# Patient Record
Sex: MALE | Race: WHITE | NOT HISPANIC OR LATINO | Employment: OTHER | RURAL
[De-identification: names, ages, dates, MRNs, and addresses within clinical notes are randomized per-mention and may not be internally consistent; named-entity substitution may affect disease eponyms.]

---

## 2023-10-03 ENCOUNTER — HOSPITAL ENCOUNTER (EMERGENCY)
Facility: HOSPITAL | Age: 68
Discharge: HOME | End: 2023-10-03
Payer: MEDICARE

## 2023-10-03 VITALS
SYSTOLIC BLOOD PRESSURE: 147 MMHG | HEIGHT: 67 IN | WEIGHT: 154 LBS | RESPIRATION RATE: 16 BRPM | HEART RATE: 74 BPM | TEMPERATURE: 98 F | BODY MASS INDEX: 24.17 KG/M2 | OXYGEN SATURATION: 97 % | DIASTOLIC BLOOD PRESSURE: 71 MMHG

## 2023-10-03 DIAGNOSIS — L03.90 ACUTE CELLULITIS: ICD-10-CM

## 2023-10-03 DIAGNOSIS — N18.4 STAGE 4 CHRONIC KIDNEY DISEASE: ICD-10-CM

## 2023-10-03 DIAGNOSIS — E86.0 MILD DEHYDRATION: ICD-10-CM

## 2023-10-03 DIAGNOSIS — K59.00 CONSTIPATION: ICD-10-CM

## 2023-10-03 DIAGNOSIS — R53.1 WEAKNESS: Primary | ICD-10-CM

## 2023-10-03 DIAGNOSIS — M62.838 MUSCLE SPASM: ICD-10-CM

## 2023-10-03 LAB
ALBUMIN SERPL BCP-MCNC: 3.2 G/DL (ref 3.5–5)
ALBUMIN/GLOB SERPL: 0.8 {RATIO}
ALP SERPL-CCNC: 242 U/L (ref 45–115)
ALT SERPL W P-5'-P-CCNC: 71 U/L (ref 16–61)
ANION GAP SERPL CALCULATED.3IONS-SCNC: 15 MMOL/L (ref 7–16)
AST SERPL W P-5'-P-CCNC: 21 U/L (ref 15–37)
BACTERIA #/AREA URNS HPF: ABNORMAL /HPF
BASOPHILS # BLD AUTO: 0.05 K/UL (ref 0–0.2)
BASOPHILS NFR BLD AUTO: 0.5 % (ref 0–1)
BILIRUB SERPL-MCNC: 0.5 MG/DL (ref ?–1.2)
BILIRUB UR QL STRIP: NEGATIVE
BUN SERPL-MCNC: 43 MG/DL (ref 7–18)
BUN/CREAT SERPL: 15 (ref 6–20)
CALCIUM SERPL-MCNC: 9.5 MG/DL (ref 8.5–10.1)
CHLORIDE SERPL-SCNC: 105 MMOL/L (ref 98–107)
CK SERPL-CCNC: 150 U/L (ref 39–308)
CLARITY UR: CLEAR
CO2 SERPL-SCNC: 26 MMOL/L (ref 21–32)
COLOR UR: YELLOW
CREAT SERPL-MCNC: 2.86 MG/DL (ref 0.7–1.3)
DIFFERENTIAL METHOD BLD: ABNORMAL
EGFR (NO RACE VARIABLE) (RUSH/TITUS): 23 ML/MIN/1.73M2
EOSINOPHIL # BLD AUTO: 0.51 K/UL (ref 0–0.5)
EOSINOPHIL NFR BLD AUTO: 5.5 % (ref 1–4)
ERYTHROCYTE [DISTWIDTH] IN BLOOD BY AUTOMATED COUNT: 13.2 % (ref 11.5–14.5)
GLOBULIN SER-MCNC: 4.2 G/DL (ref 2–4)
GLUCOSE SERPL-MCNC: 100 MG/DL (ref 74–106)
GLUCOSE UR STRIP-MCNC: NEGATIVE MG/DL
HCT VFR BLD AUTO: 34 % (ref 40–54)
HGB BLD-MCNC: 11 G/DL (ref 13.5–18)
KETONES UR STRIP-SCNC: NEGATIVE MG/DL
LACTATE SERPL-SCNC: 0.6 MMOL/L (ref 0.4–2)
LEUKOCYTE ESTERASE UR QL STRIP: NEGATIVE
LYMPHOCYTES # BLD AUTO: 1.6 K/UL (ref 1–4.8)
LYMPHOCYTES NFR BLD AUTO: 17.1 % (ref 27–41)
MAGNESIUM SERPL-MCNC: 2.3 MG/DL (ref 1.7–2.3)
MCH RBC QN AUTO: 28.8 PG (ref 27–31)
MCHC RBC AUTO-ENTMCNC: 32.4 G/DL (ref 32–36)
MCV RBC AUTO: 89 FL (ref 80–96)
MONOCYTES # BLD AUTO: 1.15 K/UL (ref 0–0.8)
MONOCYTES NFR BLD AUTO: 12.3 % (ref 2–6)
MPC BLD CALC-MCNC: 10.9 FL (ref 9.4–12.4)
NEUTROPHILS # BLD AUTO: 6.02 K/UL (ref 1.8–7.7)
NEUTROPHILS NFR BLD AUTO: 64.6 % (ref 53–65)
NITRITE UR QL STRIP: NEGATIVE
PH UR STRIP: 5 PH UNITS
PLATELET # BLD AUTO: 273 K/UL (ref 150–400)
POTASSIUM SERPL-SCNC: 3.6 MMOL/L (ref 3.5–5.1)
PROT SERPL-MCNC: 7.4 G/DL (ref 6.4–8.2)
PROT UR QL STRIP: 100
RBC # BLD AUTO: 3.82 M/UL (ref 4.6–6.2)
RBC # UR STRIP: ABNORMAL /UL
RBC #/AREA URNS HPF: ABNORMAL /HPF
SODIUM SERPL-SCNC: 142 MMOL/L (ref 136–145)
SP GR UR STRIP: 1.01
SQUAMOUS #/AREA URNS LPF: ABNORMAL /LPF
TRANS CELLS #/AREA URNS LPF: ABNORMAL /LPF
TROPONIN I SERPL DL<=0.01 NG/ML-MCNC: 10 PG/ML
UROBILINOGEN UR STRIP-ACNC: 0.2 MG/DL
WBC # BLD AUTO: 9.33 K/UL (ref 4.5–11)
WBC #/AREA URNS HPF: ABNORMAL /HPF

## 2023-10-03 PROCEDURE — 93010 EKG 12-LEAD: ICD-10-PCS | Mod: ,,, | Performed by: HOSPITALIST

## 2023-10-03 PROCEDURE — 96361 HYDRATE IV INFUSION ADD-ON: CPT

## 2023-10-03 PROCEDURE — 83605 ASSAY OF LACTIC ACID: CPT | Performed by: NURSE PRACTITIONER

## 2023-10-03 PROCEDURE — 99284 EMERGENCY DEPT VISIT MOD MDM: CPT | Mod: GF

## 2023-10-03 PROCEDURE — 99285 EMERGENCY DEPT VISIT HI MDM: CPT | Mod: 25

## 2023-10-03 PROCEDURE — 96372 THER/PROPH/DIAG INJ SC/IM: CPT | Mod: 59 | Performed by: NURSE PRACTITIONER

## 2023-10-03 PROCEDURE — 93010 ELECTROCARDIOGRAM REPORT: CPT | Mod: ,,, | Performed by: HOSPITALIST

## 2023-10-03 PROCEDURE — 96365 THER/PROPH/DIAG IV INF INIT: CPT

## 2023-10-03 PROCEDURE — 82550 ASSAY OF CK (CPK): CPT | Performed by: NURSE PRACTITIONER

## 2023-10-03 PROCEDURE — 99284 PR EMERGENCY DEPT VISIT,LEVEL IV: ICD-10-PCS | Mod: ,,, | Performed by: NURSE PRACTITIONER

## 2023-10-03 PROCEDURE — 63600175 PHARM REV CODE 636 W HCPCS: Performed by: NURSE PRACTITIONER

## 2023-10-03 PROCEDURE — 99284 EMERGENCY DEPT VISIT MOD MDM: CPT | Mod: ,,, | Performed by: NURSE PRACTITIONER

## 2023-10-03 PROCEDURE — 83735 ASSAY OF MAGNESIUM: CPT | Performed by: NURSE PRACTITIONER

## 2023-10-03 PROCEDURE — 93005 ELECTROCARDIOGRAM TRACING: CPT

## 2023-10-03 PROCEDURE — 84484 ASSAY OF TROPONIN QUANT: CPT | Performed by: NURSE PRACTITIONER

## 2023-10-03 PROCEDURE — 85025 COMPLETE CBC W/AUTO DIFF WBC: CPT | Performed by: NURSE PRACTITIONER

## 2023-10-03 PROCEDURE — 25000003 PHARM REV CODE 250: Performed by: NURSE PRACTITIONER

## 2023-10-03 PROCEDURE — 81001 URINALYSIS AUTO W/SCOPE: CPT | Performed by: NURSE PRACTITIONER

## 2023-10-03 PROCEDURE — 80053 COMPREHEN METABOLIC PANEL: CPT | Performed by: NURSE PRACTITIONER

## 2023-10-03 RX ORDER — CALCIUM CARB, CITRATE, MALATE 250 MG
CAPSULE ORAL
COMMUNITY

## 2023-10-03 RX ORDER — ATORVASTATIN CALCIUM 40 MG/1
40 TABLET, FILM COATED ORAL DAILY
COMMUNITY

## 2023-10-03 RX ORDER — ORPHENADRINE CITRATE 30 MG/ML
60 INJECTION INTRAMUSCULAR; INTRAVENOUS
Status: COMPLETED | OUTPATIENT
Start: 2023-10-03 | End: 2023-10-03

## 2023-10-03 RX ORDER — BETHANECHOL CHLORIDE 10 MG/1
10 TABLET ORAL 3 TIMES DAILY
COMMUNITY

## 2023-10-03 RX ORDER — ASCORBIC ACID 125 MG
TABLET,CHEWABLE ORAL
COMMUNITY

## 2023-10-03 RX ORDER — GABAPENTIN 300 MG/1
300 CAPSULE ORAL NIGHTLY
COMMUNITY

## 2023-10-03 RX ORDER — OXYBUTYNIN CHLORIDE 5 MG/1
5 TABLET ORAL 3 TIMES DAILY
COMMUNITY

## 2023-10-03 RX ORDER — CLINDAMYCIN HYDROCHLORIDE 300 MG/1
300 CAPSULE ORAL 3 TIMES DAILY
Qty: 30 CAPSULE | Refills: 0 | Status: SHIPPED | OUTPATIENT
Start: 2023-10-03 | End: 2023-10-13

## 2023-10-03 RX ORDER — FINASTERIDE 5 MG/1
5 TABLET, FILM COATED ORAL DAILY
COMMUNITY

## 2023-10-03 RX ORDER — GABAPENTIN 100 MG/1
100 CAPSULE ORAL NIGHTLY
COMMUNITY

## 2023-10-03 RX ORDER — CLOPIDOGREL BISULFATE 75 MG/1
75 TABLET ORAL DAILY
COMMUNITY

## 2023-10-03 RX ORDER — FERROUS SULFATE 325(65) MG
325 TABLET ORAL
COMMUNITY

## 2023-10-03 RX ORDER — ESZOPICLONE 1 MG/1
1 TABLET, FILM COATED ORAL NIGHTLY
COMMUNITY

## 2023-10-03 RX ORDER — ASPIRIN 81 MG/1
81 TABLET ORAL DAILY
COMMUNITY

## 2023-10-03 RX ORDER — BACLOFEN 5 MG/1
5 TABLET ORAL 3 TIMES DAILY PRN
Qty: 15 TABLET | Refills: 0 | Status: SHIPPED | OUTPATIENT
Start: 2023-10-03 | End: 2023-10-08

## 2023-10-03 RX ORDER — ACETAMINOPHEN 325 MG/1
650 TABLET ORAL
Status: COMPLETED | OUTPATIENT
Start: 2023-10-03 | End: 2023-10-03

## 2023-10-03 RX ORDER — TAMSULOSIN HYDROCHLORIDE 0.4 MG/1
CAPSULE ORAL DAILY
COMMUNITY

## 2023-10-03 RX ORDER — AMLODIPINE BESYLATE 10 MG/1
10 TABLET ORAL DAILY
COMMUNITY

## 2023-10-03 RX ADMIN — ACETAMINOPHEN 650 MG: 325 TABLET ORAL at 05:10

## 2023-10-03 RX ADMIN — SODIUM CHLORIDE 1000 ML: 9 INJECTION, SOLUTION INTRAVENOUS at 04:10

## 2023-10-03 RX ADMIN — CEFTRIAXONE SODIUM 1 G: 1 INJECTION, POWDER, FOR SOLUTION INTRAMUSCULAR; INTRAVENOUS at 05:10

## 2023-10-03 RX ADMIN — ORPHENADRINE CITRATE 60 MG: 60 INJECTION INTRAMUSCULAR; INTRAVENOUS at 04:10

## 2023-10-03 NOTE — DISCHARGE INSTRUCTIONS
Take Clindamycin as prescribed for the cellulitis in your left hand. Elevate left hand while lying or sitting. Apply warm compresses to left hand for 20 minutes 4 times per day. Drink plenty of liquids to stay hydrated. Take Baclofen 1/2 -1 tab every 8 hours as needed for muscle spasm along with Tylenol as needed for pain. Apply warm compresses to neck. Biofreeze or Icy Hot may help with your spasms as well. Take Miralax, magnesium citrate or dulcolax for constipation. As discussed, Clindamycin often causes diarrhea, so you may not need as much of a laxative while taking it. Take Probiotic or eat probiotic yogurt twice a day while on Clindamycin. As discussed, it will take time for your muscle spasm to improve. Follow-up with your PCP if not starting to feel some better in 3-5 days. Return to the ED for worsening symptoms.

## 2023-10-03 NOTE — ED PROVIDER NOTES
"Encounter Date: 10/3/2023       History     Chief Complaint   Patient presents with    Fatigue     Patient comes in with daughter, it is reported he had a fall on Wednesday, she took him to her home to live with her. She reports confusion. He is alert and oriented at this time. He has redness and warmth to left hand where he reports 3 weeks ago he had an IV place for eye surgery. He was ambulating with walker prior to fall. He has right hip and neck pan x 1 week which he states occurred prior to his fall.     Presented with daughter c/o generalized weakness that has worsened over the last week, decreased appetite, erythema/swelling/pain to left hand at previous IV site, and persistent neck pain since falling 1 week ago. Pt lives alone. He had cataract surgery 3 weeks ago, and the IV site from that IV is showing signs of infection. He fell from standing pos while using his walker 1 week ago and laid in the floor 4-5 hours because he was unable to get up without assist. He was taken to [facility] ED where he had xrays and labs. His daughter reports that they were "fine." She reports previous bout of THOMAS. His creatinine went up to 7 and improved after treatment. Is still under the care of nephrology but is unsure of what his baseline creatinine is now. Is not on dialysis. Daughter has brought pt home to live with her now. He denies chest pain, dypsnea, abd pain, n/v/d, dysuria or hematuria. PMH of CKD, HTN, DM-2, previous CVA with chronic right sided weakness, vena cava stent.      Review of patient's allergies indicates:   Allergen Reactions    Codeine     Oxycodone     Penicillin      Past Medical History:   Diagnosis Date    Hypertension     Renal disorder     Stroke      Past Surgical History:   Procedure Laterality Date    CARDIAC SURGERY      EYE SURGERY       History reviewed. No pertinent family history.     Review of Systems   Constitutional:  Positive for activity change. Negative for appetite change and " fever.   HENT:  Negative for congestion.    Respiratory:  Negative for cough, shortness of breath and wheezing.    Cardiovascular:  Negative for chest pain and palpitations.   Gastrointestinal:  Negative for abdominal pain, diarrhea, nausea and vomiting.   Genitourinary:  Negative for difficulty urinating, dysuria, flank pain and frequency.   Musculoskeletal:  Positive for neck pain.   Skin:  Positive for color change (erythema left hand).   Neurological:  Positive for weakness (generalized). Negative for dizziness, tremors, speech difficulty and headaches.   Psychiatric/Behavioral: Negative.         Physical Exam     Initial Vitals [10/03/23 1527]   BP Pulse Resp Temp SpO2   (!) 143/73 68 18 98.4 °F (36.9 °C) 98 %      MAP       --         Physical Exam    Nursing note and vitals reviewed.  Constitutional: He appears well-developed.  Non-toxic appearance. He appears ill. No distress.   HENT:   Head: Normocephalic and atraumatic.   Right Ear: External ear normal.   Left Ear: External ear normal.   Nose: Nose normal.   Mouth/Throat: Oropharynx is clear and moist.   Eyes: Conjunctivae and EOM are normal. Pupils are equal, round, and reactive to light.   Neck:   Right sided neck pain with ROM, muscle spasm noted to right neck and right superior shoulder.   Normal range of motion.  Cardiovascular:  Normal rate, regular rhythm, normal heart sounds and intact distal pulses.           No murmur heard.  Pulmonary/Chest: Breath sounds normal. He has no wheezes. He has no rhonchi. He has no rales.   Abdominal: Abdomen is soft. Bowel sounds are normal. There is no abdominal tenderness.   Musculoskeletal:         General: Normal range of motion.      Cervical back: Normal range of motion.      Comments: Right BKA, right sided ext weakness. Muscle spasm noted to right neck and shoulder       Neurological: He is alert and oriented to person, place, and time. GCS score is 15. GCS eye subscore is 4. GCS verbal subscore is 5. GCS  motor subscore is 6.   Skin: Skin is warm and dry. Capillary refill takes less than 2 seconds.   Psychiatric: He has a normal mood and affect.         Medical Screening Exam   See Full Note    ED Course   Procedures  Labs Reviewed   COMPREHENSIVE METABOLIC PANEL - Abnormal; Notable for the following components:       Result Value    BUN 43 (*)     Creatinine 2.86 (*)     Albumin 3.2 (*)     Globulin 4.2 (*)     Alk Phos 242 (*)     ALT 71 (*)     eGFR 23 (*)     All other components within normal limits   URINALYSIS, REFLEX TO URINE CULTURE - Abnormal; Notable for the following components:    Protein,  (*)     Blood, UA Large (*)     All other components within normal limits   CBC WITH DIFFERENTIAL - Abnormal; Notable for the following components:    RBC 3.82 (*)     Hemoglobin 11.0 (*)     Hematocrit 34.0 (*)     Lymphocytes % 17.1 (*)     Monocytes % 12.3 (*)     Eosinophils % 5.5 (*)     Monocytes, Absolute 1.15 (*)     Eosinophils, Absolute 0.51 (*)     All other components within normal limits   URINALYSIS, MICROSCOPIC - Abnormal; Notable for the following components:    RBC, UA 10-15 (*)     Bacteria, UA Few (*)     Transitional Epithelial Cells, UA Few (*)     All other components within normal limits   LACTIC ACID, PLASMA - Normal   TROPONIN I - Normal   MAGNESIUM - Normal   CK - Normal   CBC W/ AUTO DIFFERENTIAL    Narrative:     The following orders were created for panel order CBC auto differential.  Procedure                               Abnormality         Status                     ---------                               -----------         ------                     CBC with Differential[[id number]]       Abnormal            Final result                 Please view results for these tests on the individual orders.     EKG Readings: (Independently Interpreted)   Initial Reading: No STEMI. Rhythm: Normal Sinus Rhythm. Heart Rate: 65.   Reviewed at 1547. No previous for comparison.     ECG Results               EKG 12-lead (Final result)  Result time 10/03/23 17:59:36      Final result by Interface, Lab [facility] (10/03/23 17:59:36)                   Narrative:    Test Reason : R53.1,    Vent. Rate : 065 BPM     Atrial Rate : 065 BPM     P-R Int : 200 ms          QRS Dur : 118 ms      QT Int : 400 ms       P-R-T Axes : 000 -66 026 degrees     QTc Int : 416 ms    Normal sinus rhythm  Right bundle branch block  Left anterior fascicular block   Bifascicular block   No previous ECGs available  Confirmed by Teto STEELE, Chayo LANGSTON ([id number]) on 10/3/2023 5:59:27 PM    Referred By: [email or web address]   SELF           Confirmed By:Chayo Irene MD                                  Imaging Results              CT Cervical Spine Without Contrast (Final result)  Result time 10/03/23 16:36:00      Final result by Shawn Vallecillo DO (10/03/23 16:36:00)                   Impression:      No acute traumatic injury to the cervical spine.      Electronically signed by: Shawn Vallecillo  Date:    10/03/2023  Time:    16:36               Narrative:    EXAMINATION:  CT CERVICAL SPINE WITHOUT CONTRAST    CLINICAL HISTORY:  fall last week with perisistent neck pain;    TECHNIQUE:  Multiplanar CT of the cervical spine without contrast.    COMPARISON:  None.    FINDINGS:  Moderate multilevel degenerative change of the cervical spine.    The vertebral height and alignment is normal.    There is no evidence for acute fracture or subluxation.    No prevertebral soft tissue swelling is suggested.    The atlantoaxial and atlantooccipital articulations are normal.    Skull base appears unremarkable.    The lung apices are clear.    The thyroid gland appears normal.                                       X-Ray Abdomen AP 1 View (KUB) (Final result)  Result time 10/03/23 16:35:15      Final result by Sahwn Vallecillo DO (10/03/23 16:35:15)                   Impression:      No bowel obstruction or renal calculi.  Moderate colonic  stool      Electronically signed by: Shawn Vallecillo  Date:    10/03/2023  Time:    16:35               Narrative:    EXAMINATION:  XR ABDOMEN AP 1 VIEW    CLINICAL HISTORY:  Constipation, unspecified    TECHNIQUE:  XR ABDOMEN AP 1 VIEW    COMPARISON:  None    FINDINGS:  Lower lobes are clear    There is no bowel obstruction.  No free air.  No renal calculi.    Liver and renal silhouettes are normal.    No acute bone findings.  IVC filter in place                                       X-Ray Chest AP Portable (Final result)  Result time 10/03/23 16:34:51      Final result by Shawn Vallecillo DO (10/03/23 16:34:51)                   Impression:      No acute pulmonary disease      Electronically signed by: Shawn Vallecillo  Date:    10/03/2023  Time:    16:34               Narrative:    EXAMINATION:  XR CHEST AP PORTABLE    CLINICAL HISTORY:  Chest Pain;    TECHNIQUE:  XR CHEST AP PORTABLE    COMPARISON:  None    FINDINGS:  No lines or tubes.    Lungs are clear.    Normal pleura.    Cardiac silhouette is normal    No obvious acute bone findings.                                       Medications   sodium chloride 0.9% bolus 1,000 mL 1,000 mL (0 mLs Intravenous Stopped 10/3/23 1737)   orphenadrine injection 60 mg (60 mg Intramuscular Given 10/3/23 1646)   cefTRIAXone (ROCEPHIN) 1 g in dextrose 5 % in water (D5W) 100 mL IVPB (MB+) (0 g Intravenous Stopped 10/3/23 1805)   acetaminophen tablet 650 mg (650 mg Oral Given 10/3/23 1737)     Medical Decision Making  Presented with daughter c/o generalized weakness that has worsened over the last week, decreased appetite, erythema/swelling/pain to left hand at previous IV site, and persistent neck pain since falling 1 week ago. Pt lives alone. He had cataract surgery 3 weeks ago, and the IV site from that IV is showing signs of infection. He fell from standing pos while using his walker 1 week ago and laid in the floor 4-5 hours because he was unable to get up without  "assist. He was taken to [facility] ED where he had xrays and labs. His daughter reports that they were "fine." She reports previous bout of THOMAS. His creatinine went up to 7 and improved after treatment. Is still under the care of nephrology but is unsure of what his baseline creatinine is now. Is not on dialysis. Daughter has brought pt home to live with her now. He denies chest pain, dypsnea, abd pain, n/v/d, dysuria or hematuria. PMH of CKD, HTN, DM-2, previous CVA with chronic right sided weakness, vena cava stent.    Amount and/or Complexity of Data Reviewed  Labs: ordered. Decision-making details documented in ED Course.     Details: WBC 9330 with H&H 11 and 34, plt [id number], BUN 43, creatinine 2.8 (baseline 40 and 2.5), Mg 2.3, Na 142, K+ 3.6, ALT 71m AST 21, Lactate 0.6, , troponin 10. UA shows no evidence of infection. Blood noted but urine is cathed specimen.  Radiology: ordered.     Details: CT cervical spine: No acute traumatic injury to the cervical spine. KUB: No bowel obstruction or renal calculi.  Moderate colonic stool. CXR: No acute pulmonary disease    Risk  OTC drugs.  Prescription drug management.  Risk Details: Rocephin 1 Gm IVPB given. Ns bolus x 1 liter. Orphenadrine 60 mg IM, Tylenol 650 mg po given.    Rx for Clindamycin, baclofen. Discussed diagnoses, home care, meds, follow-up and return precautions. Pt is stable. Discharged home with detailed instructions provided.               ED Course as of 10/03/23 1834   Tue Oct 03, 2023   1600 Daughter contacted his PCP for baseline BUN and creatinine. They are 40 and 2.5. [DP]   1612 WBC: 9.33 [DP]   1612 Hemoglobin(!): 11.0 [DP]   1612 Hematocrit(!): 34.0 [DP]   1612 Platelet Count: 273 [DP]   1626 CPK: 150 [DP]   1627 Troponin I High Sensitivity: 10.0 [DP]   1627 Magnesium : 2.3 [DP]   1627 Sodium: 142 [DP]   1627 Potassium: 3.6 [DP]   1627 BUN(!): 43 [DP]   1627 Creatinine(!): 2.86 [DP]   1627 ALP(!): 242 [DP]   1627 ALT(!): 71 [DP] "   1627 AST: 21 [DP]   1628 Lactate, Jesus: 0.6 [DP]   1806 WBC, UA: 0-5 [DP]   1806 Bacteria, UA(!): Few [DP]   1806 Leukocytes, UA: Negative [DP]   1806 NITRITE UA: Negative [DP]      ED Course User Index  [DP] Debra Leslie NP                      Clinical Impression:   Final diagnoses:  [R53.1] Weakness (Primary)  [K59.00] Constipation  [M62.838] Muscle spasm  [E86.0] Mild dehydration  [N18.4] Stage 4 chronic kidney disease  [L03.90] Acute cellulitis - left hand        ED Disposition Condition    Discharge Stable          ED Prescriptions       Medication Sig Dispense Start Date End Date Auth. Provider    baclofen (LIORESAL) 5 mg Tab tablet Take 1 tablet (5 mg total) by mouth 3 (three) times daily as needed (muscle spasm). Give 1/2-1 tablet every 8 hours as needed for muscle spasm. 15 tablet 10/3/2023 10/8/2023 Debra Leslie NP    clindamycin (CLEOCIN) 300 MG capsule Take 1 capsule (300 mg total) by mouth 3 (three) times daily. for 10 days 30 capsule 10/3/2023 10/13/2023 Debra Leslie NP          Follow-up Information       Follow up With Specialties Details Why Contact Info    [facility] - Emergency Department Emergency Medicine  If symptoms worsen [address] [id number] [phone]    Bailee Durbin, DO Family Medicine In 3 days If not starting to get better [address]  [facility] [address]  [phone]               Debra Leslie NP  10/03/23 8051

## 2023-12-20 NOTE — ADDENDUM NOTE
Encounter addended by: Farzaneh Keller on: 12/20/2023 10:53 AM   Actions taken: Charge Capture section accepted

## 2024-09-02 ENCOUNTER — HOSPITAL ENCOUNTER (EMERGENCY)
Facility: HOSPITAL | Age: 69
Discharge: HOME | End: 2024-09-02
Payer: MEDICARE

## 2024-09-02 VITALS
WEIGHT: 164 LBS | TEMPERATURE: 98 F | DIASTOLIC BLOOD PRESSURE: 78 MMHG | SYSTOLIC BLOOD PRESSURE: 136 MMHG | HEIGHT: 67 IN | RESPIRATION RATE: 17 BRPM | HEART RATE: 60 BPM | BODY MASS INDEX: 25.74 KG/M2 | OXYGEN SATURATION: 97 %

## 2024-09-02 DIAGNOSIS — N18.9 ACUTE KIDNEY INJURY SUPERIMPOSED ON CKD: ICD-10-CM

## 2024-09-02 DIAGNOSIS — N30.01 ACUTE CYSTITIS WITH HEMATURIA: ICD-10-CM

## 2024-09-02 DIAGNOSIS — R73.9 HYPERGLYCEMIA: Primary | ICD-10-CM

## 2024-09-02 DIAGNOSIS — N17.9 ACUTE KIDNEY INJURY SUPERIMPOSED ON CKD: ICD-10-CM

## 2024-09-02 DIAGNOSIS — E87.5 HYPERKALEMIA: ICD-10-CM

## 2024-09-02 DIAGNOSIS — I45.10 RBBB: ICD-10-CM

## 2024-09-02 DIAGNOSIS — S01.01XA LACERATION OF SCALP, INITIAL ENCOUNTER: ICD-10-CM
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
## 2024-09-02 PROBLEM — Z89.511 HX OF RIGHT BKA: Status: ACTIVE | Noted: 2023-06-14

## 2024-09-02 PROBLEM — I69.391 DYSPHAGIA AS LATE EFFECT OF STROKE: Status: ACTIVE | Noted: 2024-09-02

## 2024-09-02 PROBLEM — I61.9 ICH (INTRACEREBRAL HEMORRHAGE): Status: ACTIVE | Noted: 2024-09-02

## 2024-09-02 PROBLEM — Z16.21 INFECTION DUE TO VANCOMYCIN RESISTANT ENTEROCOCCUS (VRE): Status: ACTIVE | Noted: 2024-09-02

## 2024-09-02 PROBLEM — G70.9 ACUTE NEUROMUSCULAR RESPIRATORY FAILURE: Status: ACTIVE | Noted: 2024-09-02

## 2024-09-02 PROBLEM — D63.1 ANEMIA IN STAGE 4 CHRONIC KIDNEY DISEASE: Status: ACTIVE | Noted: 2023-01-04

## 2024-09-02 PROBLEM — J96.00 ACUTE NEUROMUSCULAR RESPIRATORY FAILURE: Status: ACTIVE | Noted: 2024-01-01

## 2024-09-02 PROBLEM — E11.9 DIABETES MELLITUS, TYPE 2: Status: ACTIVE | Noted: 2018-02-25

## 2024-09-02 PROBLEM — G25.81 RLS (RESTLESS LEGS SYNDROME): Status: ACTIVE | Noted: 2023-08-17

## 2024-09-02 PROBLEM — Z86.73 HISTORY OF CEREBELLAR STROKE: Status: ACTIVE | Noted: 2022-05-02

## 2024-09-02 PROBLEM — N18.4 ANEMIA IN STAGE 4 CHRONIC KIDNEY DISEASE: Status: ACTIVE | Noted: 2023-01-04

## 2024-09-02 PROBLEM — N25.81 SECONDARY HYPERPARATHYROIDISM, RENAL: Status: ACTIVE | Noted: 2023-01-04

## 2024-09-02 PROBLEM — M86.9 OSTEOMYELITIS OF RIGHT LEG: Status: ACTIVE | Noted: 2024-09-02

## 2024-09-02 PROBLEM — N18.4 CKD (CHRONIC KIDNEY DISEASE) STAGE 4, GFR 15-29 ML/MIN: Status: ACTIVE | Noted: 2023-01-04

## 2024-09-02 PROBLEM — J96.90 ACUTE NEUROMUSCULAR RESPIRATORY FAILURE: Status: ACTIVE | Noted: 2024-09-02

## 2024-09-02 PROBLEM — R00.1 BRADYCARDIA: Status: ACTIVE | Noted: 2024-09-02

## 2024-09-02 PROBLEM — I65.09 VERTEBRAL ARTERY STENOSIS: Status: ACTIVE | Noted: 2024-09-02

## 2024-09-02 PROBLEM — A49.1 INFECTION DUE TO VANCOMYCIN RESISTANT ENTEROCOCCUS (VRE): Status: ACTIVE | Noted: 2024-09-02

## 2024-09-02 PROBLEM — R26.89 POOR BALANCE: Status: ACTIVE | Noted: 2020-02-18

## 2024-09-02 LAB
ACETONE SERPL QL SCN: NEGATIVE
ALBUMIN SERPL BCP-MCNC: 3.2 G/DL (ref 3.5–5)
ALBUMIN/GLOB SERPL: 1.1 {RATIO}
ALP SERPL-CCNC: 179 U/L (ref 45–115)
ALT SERPL W P-5'-P-CCNC: 58 U/L (ref 16–61)
ANION GAP SERPL CALCULATED.3IONS-SCNC: 16 MMOL/L (ref 7–16)
ANION GAP SERPL CALCULATED.3IONS-SCNC: 18 MMOL/L (ref 7–16)
ANION GAP SERPL CALCULATED.3IONS-SCNC: 19 MMOL/L (ref 7–16)
ANION GAP SERPL CALCULATED.3IONS-SCNC: 19 MMOL/L (ref 7–16)
AST SERPL W P-5'-P-CCNC: 16 U/L (ref 15–37)
BACTERIA #/AREA URNS HPF: ABNORMAL /HPF
BASOPHILS # BLD AUTO: 0.02 K/UL (ref 0–0.2)
BASOPHILS NFR BLD AUTO: 0.2 % (ref 0–1)
BILIRUB SERPL-MCNC: 0.6 MG/DL (ref ?–1.2)
BILIRUB UR QL STRIP: NEGATIVE
BUN SERPL-MCNC: 102 MG/DL (ref 7–18)
BUN SERPL-MCNC: 103 MG/DL (ref 7–18)
BUN SERPL-MCNC: 104 MG/DL (ref 7–18)
BUN SERPL-MCNC: 99 MG/DL (ref 7–18)
BUN/CREAT SERPL: 32 (ref 6–20)
BUN/CREAT SERPL: 34 (ref 6–20)
BUN/CREAT SERPL: 35 (ref 6–20)
BUN/CREAT SERPL: 35 (ref 6–20)
CALCIUM SERPL-MCNC: 7.5 MG/DL (ref 8.5–10.1)
CALCIUM SERPL-MCNC: 7.6 MG/DL (ref 8.5–10.1)
CALCIUM SERPL-MCNC: 7.6 MG/DL (ref 8.5–10.1)
CALCIUM SERPL-MCNC: 8 MG/DL (ref 8.5–10.1)
CHLORIDE SERPL-SCNC: 104 MMOL/L (ref 98–107)
CHLORIDE SERPL-SCNC: 109 MMOL/L (ref 98–107)
CHLORIDE SERPL-SCNC: 109 MMOL/L (ref 98–107)
CHLORIDE SERPL-SCNC: 112 MMOL/L (ref 98–107)
CLARITY UR: ABNORMAL
CO2 SERPL-SCNC: 16 MMOL/L (ref 21–32)
CO2 SERPL-SCNC: 16 MMOL/L (ref 21–32)
CO2 SERPL-SCNC: 17 MMOL/L (ref 21–32)
CO2 SERPL-SCNC: 20 MMOL/L (ref 21–32)
COLOR UR: YELLOW
CREAT SERPL-MCNC: 2.79 MG/DL (ref 0.7–1.3)
CREAT SERPL-MCNC: 2.9 MG/DL (ref 0.7–1.3)
CREAT SERPL-MCNC: 3.04 MG/DL (ref 0.7–1.3)
CREAT SERPL-MCNC: 3.27 MG/DL (ref 0.7–1.3)
DIFFERENTIAL METHOD BLD: ABNORMAL
EGFR (NO RACE VARIABLE) (RUSH/TITUS): 20 ML/MIN/1.73M2
EGFR (NO RACE VARIABLE) (RUSH/TITUS): 21 ML/MIN/1.73M2
EGFR (NO RACE VARIABLE) (RUSH/TITUS): 23 ML/MIN/1.73M2
EGFR (NO RACE VARIABLE) (RUSH/TITUS): 24 ML/MIN/1.73M2
EOSINOPHIL # BLD AUTO: 0.01 K/UL (ref 0–0.5)
EOSINOPHIL NFR BLD AUTO: 0.1 % (ref 1–4)
ERYTHROCYTE [DISTWIDTH] IN BLOOD BY AUTOMATED COUNT: 17.3 % (ref 11.5–14.5)
GLOBULIN SER-MCNC: 3 G/DL (ref 2–4)
GLUCOSE SERPL-MCNC: 154 MG/DL (ref 74–106)
GLUCOSE SERPL-MCNC: 315 MG/DL (ref 74–106)
GLUCOSE SERPL-MCNC: 365 MG/DL (ref 70–105)
GLUCOSE SERPL-MCNC: 397 MG/DL (ref 74–106)
GLUCOSE SERPL-MCNC: 453 MG/DL (ref 70–105)
GLUCOSE SERPL-MCNC: 567 MG/DL (ref 74–106)
GLUCOSE UR STRIP-MCNC: >=1000 MG/DL
HCT VFR BLD AUTO: 34.4 % (ref 40–54)
HGB BLD-MCNC: 11.4 G/DL (ref 13.5–18)
IMM GRANULOCYTES # BLD AUTO: 0.26 K/UL (ref 0–0.04)
IMM GRANULOCYTES NFR BLD: 2.8 % (ref 0–0.4)
KETONES UR STRIP-SCNC: NEGATIVE MG/DL
LEUKOCYTE ESTERASE UR QL STRIP: NEGATIVE
LYMPHOCYTES # BLD AUTO: 0.43 K/UL (ref 1–4.8)
LYMPHOCYTES NFR BLD AUTO: 4.7 % (ref 27–41)
MCH RBC QN AUTO: 28.6 PG (ref 27–31)
MCHC RBC AUTO-ENTMCNC: 33.1 G/DL (ref 32–36)
MCV RBC AUTO: 86.4 FL (ref 80–96)
MONOCYTES # BLD AUTO: 0.29 K/UL (ref 0–0.8)
MONOCYTES NFR BLD AUTO: 3.2 % (ref 2–6)
MPC BLD CALC-MCNC: 12.3 FL (ref 9.4–12.4)
NEUTROPHILS # BLD AUTO: 8.19 K/UL (ref 1.8–7.7)
NEUTROPHILS NFR BLD AUTO: 89 % (ref 53–65)
NITRITE UR QL STRIP: NEGATIVE
NRBC # BLD AUTO: 0 X10E3/UL
NRBC, AUTO (.00): 0 %
PH UR STRIP: 5.5 PH UNITS
PLATELET # BLD AUTO: 124 K/UL (ref 150–400)
POTASSIUM SERPL-SCNC: 5.3 MMOL/L (ref 3.5–5.1)
POTASSIUM SERPL-SCNC: 5.5 MMOL/L (ref 3.5–5.1)
POTASSIUM SERPL-SCNC: 5.9 MMOL/L (ref 3.5–5.1)
POTASSIUM SERPL-SCNC: 6.1 MMOL/L (ref 3.5–5.1)
PROT SERPL-MCNC: 6.2 G/DL (ref 6.4–8.2)
PROT UR QL STRIP: ABNORMAL
RBC # BLD AUTO: 3.98 M/UL (ref 4.6–6.2)
RBC # UR STRIP: ABNORMAL /UL
RBC #/AREA URNS HPF: ABNORMAL /HPF
SODIUM SERPL-SCNC: 134 MMOL/L (ref 136–145)
SODIUM SERPL-SCNC: 137 MMOL/L (ref 136–145)
SODIUM SERPL-SCNC: 139 MMOL/L (ref 136–145)
SODIUM SERPL-SCNC: 142 MMOL/L (ref 136–145)
SP GR UR STRIP: 1.01
SQUAMOUS #/AREA URNS LPF: ABNORMAL /LPF
UROBILINOGEN UR STRIP-ACNC: 0.2 MG/DL
WBC # BLD AUTO: 9.2 K/UL (ref 4.5–11)
WBC #/AREA URNS HPF: ABNORMAL /HPF

## 2024-09-02 PROCEDURE — 12001 RPR S/N/AX/GEN/TRNK 2.5CM/<: CPT

## 2024-09-02 PROCEDURE — 80053 COMPREHEN METABOLIC PANEL: CPT | Performed by: NURSE PRACTITIONER

## 2024-09-02 PROCEDURE — 81001 URINALYSIS AUTO W/SCOPE: CPT | Performed by: NURSE PRACTITIONER

## 2024-09-02 PROCEDURE — 63600175 PHARM REV CODE 636 W HCPCS: Performed by: NURSE PRACTITIONER

## 2024-09-02 PROCEDURE — 96375 TX/PRO/DX INJ NEW DRUG ADDON: CPT

## 2024-09-02 PROCEDURE — 87077 CULTURE AEROBIC IDENTIFY: CPT | Performed by: NURSE PRACTITIONER

## 2024-09-02 PROCEDURE — 93005 ELECTROCARDIOGRAM TRACING: CPT

## 2024-09-02 PROCEDURE — 90471 IMMUNIZATION ADMIN: CPT | Performed by: NURSE PRACTITIONER

## 2024-09-02 PROCEDURE — 96376 TX/PRO/DX INJ SAME DRUG ADON: CPT

## 2024-09-02 PROCEDURE — 36415 COLL VENOUS BLD VENIPUNCTURE: CPT | Performed by: NURSE PRACTITIONER

## 2024-09-02 PROCEDURE — 82009 KETONE BODYS QUAL: CPT | Performed by: NURSE PRACTITIONER

## 2024-09-02 PROCEDURE — 25000003 PHARM REV CODE 250: Performed by: NURSE PRACTITIONER

## 2024-09-02 PROCEDURE — 87186 SC STD MICRODIL/AGAR DIL: CPT | Performed by: NURSE PRACTITIONER

## 2024-09-02 PROCEDURE — G0390 TRAUMA RESPONS W/HOSP CRITI: HCPCS | Mod: TRAUMA2

## 2024-09-02 PROCEDURE — 96360 HYDRATION IV INFUSION INIT: CPT | Mod: 59

## 2024-09-02 PROCEDURE — 82962 GLUCOSE BLOOD TEST: CPT

## 2024-09-02 PROCEDURE — 99285 EMERGENCY DEPT VISIT HI MDM: CPT | Mod: 25

## 2024-09-02 PROCEDURE — 96365 THER/PROPH/DIAG IV INF INIT: CPT

## 2024-09-02 PROCEDURE — 96361 HYDRATE IV INFUSION ADD-ON: CPT

## 2024-09-02 PROCEDURE — 82803 BLOOD GASES ANY COMBINATION: CPT

## 2024-09-02 PROCEDURE — 85025 COMPLETE CBC W/AUTO DIFF WBC: CPT | Performed by: NURSE PRACTITIONER

## 2024-09-02 PROCEDURE — 90715 TDAP VACCINE 7 YRS/> IM: CPT | Performed by: NURSE PRACTITIONER

## 2024-09-02 RX ORDER — PREGABALIN 150 MG/1
150 CAPSULE ORAL
COMMUNITY
Start: 2024-07-25

## 2024-09-02 RX ORDER — PREDNISONE 20 MG/1
20 TABLET ORAL 2 TIMES DAILY
COMMUNITY
Start: 2024-08-12

## 2024-09-02 RX ORDER — SODIUM CHLORIDE 9 MG/ML
1000 INJECTION, SOLUTION INTRAVENOUS
Status: COMPLETED | OUTPATIENT
Start: 2024-09-02 | End: 2024-09-02

## 2024-09-02 RX ORDER — LIDOCAINE HYDROCHLORIDE AND EPINEPHRINE 10; 10 MG/ML; UG/ML
2 INJECTION, SOLUTION INFILTRATION; PERINEURAL
Status: COMPLETED | OUTPATIENT
Start: 2024-09-02 | End: 2024-09-02

## 2024-09-02 RX ORDER — CEPHALEXIN 500 MG/1
500 CAPSULE ORAL EVERY 8 HOURS
Qty: 21 CAPSULE | Refills: 0 | Status: SHIPPED | OUTPATIENT
Start: 2024-09-02 | End: 2024-09-09

## 2024-09-02 RX ADMIN — LIDOCAINE HYDROCHLORIDE,EPINEPHRINE BITARTRATE 2 ML: 10; .01 INJECTION, SOLUTION INFILTRATION; PERINEURAL at 10:09

## 2024-09-02 RX ADMIN — HUMAN INSULIN 5 UNITS: 100 INJECTION, SOLUTION SUBCUTANEOUS at 02:09

## 2024-09-02 RX ADMIN — TETANUS TOXOID, REDUCED DIPHTHERIA TOXOID AND ACELLULAR PERTUSSIS VACCINE, ADSORBED 0.5 ML: 5; 2.5; 8; 8; 2.5 SUSPENSION INTRAMUSCULAR at 09:09

## 2024-09-02 RX ADMIN — SODIUM POLYSTYRENE SULFONATE 15 G: 15 SUSPENSION ORAL; RECTAL at 02:09

## 2024-09-02 RX ADMIN — SODIUM CHLORIDE 1000 ML: 9 INJECTION, SOLUTION INTRAVENOUS at 10:09

## 2024-09-02 RX ADMIN — SODIUM CHLORIDE 1000 ML: 9 INJECTION, SOLUTION INTRAVENOUS at 12:09

## 2024-09-02 RX ADMIN — HUMAN INSULIN 5 UNITS: 100 INJECTION, SOLUTION SUBCUTANEOUS at 01:09

## 2024-09-02 RX ADMIN — HUMAN INSULIN 10 UNITS: 100 INJECTION, SOLUTION SUBCUTANEOUS at 10:09

## 2024-09-02 RX ADMIN — CEFTRIAXONE 1 G: 1 INJECTION, POWDER, FOR SOLUTION INTRAMUSCULAR; INTRAVENOUS at 11:09

## 2024-09-02 NOTE — DISCHARGE INSTRUCTIONS
You need to call your family doctor first thing in the morning.  Let him know you came into the ER for a fall and you had abnormal lab work.  Let him know your glucose was over 500 and you had other abnormal labs.  Do not take the prednisone tonight.  The prednisone and the UTI can both cause your glucose level to elevate.  I will send you in an oral antibiotic to take to treat the UTI.  We have a urine culture running but will not result for a few days.  If we need to change your antibiotic based on those results, we will call you and send those in.  Your family doctor will decide if you need to restart your diabetic medications.  If you develop chest pain, shortness of breath, muscle cramps, please come back to the ER to be rechecked.  If you still have your glucometer at home, check your glucose before each meal and before bedtime.  Take this log of your glucose levels to your family doctor.    The staple in your head can be removed in 7 days.  Do not use rubbing alcohol or peroxide on the wound.  You can clean with over the counter antibacterial soap and water.  If the wound becomes red, swollen or has purulent drainage, return to the ER to be rechecked.

## 2024-09-02 NOTE — ED PROVIDER NOTES
Encounter Date: 9/2/2024       History     Chief Complaint   Patient presents with    Fall     Patient comes in post fall estimated time 8 am     69 year old  male presents to the ER for evaluation after a fall.  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  He reports he lost his footing this morning, and he fell. Denies LOC.  He hit the right side of his head.  Denies pain at this time. Reports right sided scalp laceration. HX of renal disorder, HTN, CVA (with right sided residual weakness).     The history is provided by the patient and a relative.     Review of patient's allergies indicates:   Allergen Reactions    Codeine     Oxycodone     Penicillin      Past Medical History:   Diagnosis Date    Hypertension     Renal disorder     Stroke      Past Surgical History:   Procedure Laterality Date    CARDIAC SURGERY      EYE SURGERY       No family history on file.  Social History     Tobacco Use    Smoking status: Never    Smokeless tobacco: Never   Substance Use Topics    Drug use: Never     Review of Systems   Constitutional:  Negative for fever.   HENT:  Negative for sore throat.    Respiratory:  Negative for shortness of breath.    Cardiovascular:  Negative for chest pain.   Gastrointestinal:  Negative for nausea.   Genitourinary:  Negative for dysuria.   Musculoskeletal:  Negative for back pain.   Skin:  Positive for wound. Negative for rash.   Neurological:  Negative for weakness.   Hematological:  Does not bruise/bleed easily.       Physical Exam     Initial Vitals [09/02/24 0916]   BP Pulse Resp Temp SpO2   (!) 163/78 60 19 97.6 °F (36.4 °C) 98 %      MAP       --         Physical Exam    Nursing note and vitals reviewed.  Constitutional: He appears well-developed and well-nourished. He is not diaphoretic. He is cooperative. He appears ill. No distress.   Chronic ill appearing   HENT:   Head: Normocephalic. Head is with contusion and with laceration.       Eyes: Pupils are equal,  round, and reactive to light.   Neck: Neck supple.   Cardiovascular:  Normal rate, regular rhythm, normal heart sounds and intact distal pulses.     Exam reveals no gallop and no friction rub.       No murmur heard.  Pulmonary/Chest: Breath sounds normal. No respiratory distress. He has no wheezes. He has no rhonchi. He has no rales. He exhibits no tenderness.   Musculoskeletal:      Cervical back: Neck supple.     Neurological: He is alert and oriented to person, place, and time. GCS score is 15. GCS eye subscore is 4. GCS verbal subscore is 5. GCS motor subscore is 6.   Right sided weakness.  Right BKA   Skin: Skin is warm and dry. Capillary refill takes less than 2 seconds. Laceration noted.        Psychiatric: He has a normal mood and affect.         Medical Screening Exam   See Full Note    ED Course   Lac Repair    Date/Time: 9/2/2024 10:27 AM    Performed by: Adele Whiting FNP  Authorized by: Adele Whiting FNP    Consent:     Consent obtained:  Verbal    Consent given by:  Patient    Risks, benefits, and alternatives were discussed: yes      Risks discussed:  Infection, pain, poor cosmetic result and poor wound healing    Alternatives discussed:  No treatment  Universal protocol:     Procedure explained and questions answered to patient or proxy's satisfaction: yes      Immediately prior to procedure, a time out was called: yes      Patient identity confirmed:  Verbally with patient and arm band  Anesthesia:     Anesthesia method:  Local infiltration    Local anesthetic:  Lidocaine 1% WITH epi  Laceration details:     Location:  Scalp    Scalp location:  R parietal    Length (cm):  0.5  Exploration:     Limited defect created (wound extended): no      Hemostasis achieved with:  Direct pressure    Wound exploration: entire depth of wound visualized      Contaminated: no    Treatment:     Area cleansed with:  Chlorhexidine and saline    Amount of cleaning:  Standard    Debridement:  None    Undermining:   None    Scar revision: no    Skin repair:     Repair method:  Staples    Number of staples:  1  Approximation:     Approximation:  Close  Repair type:     Repair type:  Simple  Post-procedure details:     Dressing:  Open (no dressing)    Procedure completion:  Tolerated well, no immediate complications    Labs Reviewed   COMPREHENSIVE METABOLIC PANEL - Abnormal       Result Value    Sodium 134 (*)     Potassium 6.1 (*)     Chloride 104      CO2 20 (*)     Anion Gap 16      Glucose 567 (*)      (*)     Creatinine 3.27 (*)     BUN/Creatinine Ratio 32 (*)     Calcium 8.0 (*)     Total Protein 6.2 (*)     Albumin 3.2 (*)     Globulin 3.0      A/G Ratio 1.1      Bilirubin, Total 0.6      Alk Phos 179 (*)     ALT 58      AST 16      eGFR 20 (*)    CBC WITH DIFFERENTIAL - Abnormal    WBC 9.20      RBC 3.98 (*)     Hemoglobin 11.4 (*)     Hematocrit 34.4 (*)     MCV 86.4      MCH 28.6      MCHC 33.1      RDW 17.3 (*)     Platelet Count 124 (*)     MPV 12.3      Neutrophils % 89.0 (*)     Lymphocytes % 4.7 (*)     Monocytes % 3.2      Eosinophils % 0.1 (*)     Basophils % 0.2      Immature Granulocytes % 2.8 (*)     nRBC, Auto 0.0      Neutrophils, Abs 8.19 (*)     Lymphocytes, Absolute 0.43 (*)     Monocytes, Absolute 0.29      Eosinophils, Absolute 0.01      Basophils, Absolute 0.02      Immature Granulocytes, Absolute 0.26 (*)     nRBC, Absolute 0.00      Diff Type Auto     URINALYSIS, REFLEX TO URINE CULTURE - Abnormal    Color, UA Yellow      Clarity, UA Cloudy      pH, UA 5.5      Leukocytes, UA Negative      Nitrites, UA Negative      Protein, UA Trace (*)     Glucose, UA >=1000 (*)     Ketones, UA Negative      Urobilinogen, UA 0.2      Bilirubin, UA Negative      Blood, UA Small (*)     Specific Arthur, UA 1.015     URINALYSIS, MICROSCOPIC - Abnormal    WBC, UA 11-15 (*)     RBC, UA 5-10 (*)     Bacteria, UA Many (*)     Squamous Epithelial Cells, UA Few (*)    BASIC METABOLIC PANEL - Abnormal    Sodium 139       Potassium 5.5 (*)     Chloride 109 (*)     CO2 17 (*)     Anion Gap 19 (*)     Glucose 397 (*)      (*)     Creatinine 3.04 (*)     BUN/Creatinine Ratio 34 (*)     Calcium 7.5 (*)     eGFR 21 (*)    BASIC METABOLIC PANEL - Abnormal    Sodium 137      Potassium 5.9 (*)     Chloride 109 (*)     CO2 16 (*)     Anion Gap 18 (*)     Glucose 315 (*)      (*)     Creatinine 2.90 (*)     BUN/Creatinine Ratio 35 (*)     Calcium 7.6 (*)     eGFR 23 (*)    BASIC METABOLIC PANEL - Abnormal    Sodium 142      Potassium 5.3 (*)     Chloride 112 (*)     CO2 16 (*)     Anion Gap 19 (*)     Glucose 154 (*)     BUN 99 (*)     Creatinine 2.79 (*)     BUN/Creatinine Ratio 35 (*)     Calcium 7.6 (*)     eGFR 24 (*)    POCT GLUCOSE MONITORING CONTINUOUS - Abnormal    POC Glucose 453 (*)    POCT GLUCOSE MONITORING CONTINUOUS - Abnormal    POC Glucose 365 (*)    CULTURE, URINE   CBC W/ AUTO DIFFERENTIAL    Narrative:     The following orders were created for panel order CBC auto differential.  Procedure                               Abnormality         Status                     ---------                               -----------         ------                     CBC with Differential[[id number]]       Abnormal            Final result                 Please view results for these tests on the individual orders.   ACETONE    Acetone Negative       EKG Readings: (Independently Interpreted)   Initial Reading: No STEMI. Previous EKG: Compared with most recent EKG Rhythm: Normal Sinus Rhythm. Heart Rate: 60. Conduction: RBBB and Bifasicular. ST Segments: Normal ST Segments. Axis: Normal. Clinical Impression: Normal Sinus Rhythm with RBBB and with Bifasicular Block       Imaging Results              CT Cervical Spine Without Contrast (Final result)  Result time 09/02/24 09:53:37      Final result by Len Rodrigues MD (09/02/24 09:53:37)                   Impression:      No acute cervical fracture.      Electronically  signed by: Len Rodrigues MD  Date:    09/02/2024  Time:    09:53               Narrative:    EXAMINATION:  CT CERVICAL SPINE WITHOUT CONTRAST    CLINICAL HISTORY:  fall head injury;    TECHNIQUE:  Low dose axial images, sagittal and coronal reformations were performed though the cervical spine.  Contrast was not administered.    COMPARISON:  CT cervical spine, 10/03/2023.    FINDINGS:    Normal curvature and alignment.  Vertebral body heights are well maintained.  Moderate degenerative changes in the cervical spine.  Variable multilevel neural foraminal narrowing, most notable at C5-C6.  No severe central canal stenosis.  Facet and uncovertebral arthropathy at multiple levels in the cervical spine, left side worse than right.  No acute fracture identified.  Prevertebral soft tissues are normal.  Lung apices are incompletely visualized but appear negative for acute finding.                                       CT Head Without Contrast (Final result)  Result time 09/02/24 09:47:03      Final result by Len Rodrigues MD (09/02/24 09:47:03)                   Impression:      No CT evidence of acute intracranial abnormality.    Right-sided scalp soft tissue injury.    Chronic appearing encephalomalacia in the left cerebellum.      Electronically signed by: Len Rodrigues MD  Date:    09/02/2024  Time:    09:47               Narrative:    EXAMINATION:  CT HEAD WITHOUT CONTRAST    CLINICAL HISTORY:  fall head injury;    TECHNIQUE:  Low dose axial images were obtained through the head.  Coronal and sagittal reformations were also performed. Contrast was not administered.    COMPARISON:  None.    FINDINGS:  Encephalomalacia in the left cerebellum potentially related to prior infarcts though nonspecific.  Additional encephalomalacia in the brainstem and kyler.    No evidence of acute territorial infarct, hemorrhage, mass effect, or midline shift.    Ventricles are normal in size and configuration.  Dense  atherosclerotic vascular calcifications at the skull base.    No displaced calvarial fracture.  Right-sided scalp soft tissue injury and scalp hematoma.    Mucosal retention cyst in the right maxillary sinus with mild mucosal thickening in the maxillary sinuses.  Otherwise, the visualized paranasal sinuses and mastoid air cells are essentially clear.  Cerumen in the bilateral external auditory canals.  Operative changes in both globes.                                       Medications   Tdap (BOOSTRIX) vaccine injection 0.5 mL (0.5 mLs Intramuscular Given 9/2/24 0938)   LIDOcaine-EPINEPHrine 1%-1:100,000 injection 2 mL (2 mLs Other Given 9/2/24 1005)   sodium chloride 0.9% bolus 1,000 mL 1,000 mL (0 mLs Intravenous Stopped 9/2/24 1213)   insulin regular injection 10 Units 0.1 mL (10 Units Intravenous Given 9/2/24 1054)   cefTRIAXone (Rocephin) 1 g in D5W 100 mL IVPB (MB+) (0 g Intravenous Stopped 9/2/24 1234)   0.9%  NaCl infusion ( Intravenous Stopped 9/2/24 1451)   insulin regular injection 5 Units 0.05 mL (5 Units Intravenous Given 9/2/24 1301)   sodium polystyrene sulfonate 15 gram/60 mL 0.25 g/mL oral liquid 15 g (15 g Oral Given 9/2/24 1451)   sodium chloride 0.9% bolus 500 mL 500 mL ( Intravenous Stopped 9/2/24 1639)   insulin regular injection 5 Units 0.05 mL (5 Units Intravenous Given 9/2/24 1452)     Medical Decision Making  Problems Addressed:  Acute cystitis with hematuria: acute illness or injury     Details: Urine culture, Keflex, Rocephin in ER  Acute kidney injury superimposed on CKD: chronic illness or injury with exacerbation, progression, or side effects of treatment     Details: Patient to follow-up with nephrology at [facility]  Hyperglycemia: acute illness or injury     Details: He will monitor his glucose at home and keep a log for the PCP.  He will take the Keflex for the UTI while culture is pending.  He will drink plenty of water and follow a diabetic diet. He is to call PCP  tomorrow to get an appointment for this week and return to the ER PRN. Not in DKA currently.  He will hold the prednisone tonight until he speaks to his PCP in the morning.   Hyperkalemia: acute illness or injury     Details: Kayexalate, IVF's, insulin to treat the hyperglycemia.  He will increase water intake at home and monitor glucose levels.  He will call PCP tomorrow.   Laceration of scalp, initial encounter: self-limited or minor problem     Details: Wound care and staple removal in 7 days.   RBBB: chronic illness or injury    Amount and/or Complexity of Data Reviewed  Labs: ordered. Decision-making details documented in ED Course.  Radiology: ordered. Decision-making details documented in ED Course.  ECG/medicine tests: ordered. Decision-making details documented in ED Course.    Risk  OTC drugs.  Prescription drug management.               ED Course as of 09/02/24 1648   Mon Sep 02, 2024   1016 Potassium(!): 6.1  EKG, Kayexalate, IVFs and Insulin ordered.   [AG]   1017 Glucose(!!): 567  Patient reports hx of DM II, diet controlled.  He reports he was taken off of his medications.  Ordered IVF's, insulin, VBG, Serum acetone. [AG]   1017 Creatinine(!): 3.27  HX of CKD, but creatinine and BUN are above baseline.  Will hydrate with IVFs [AG]   1130 Ketones, UA: Negative [AG]   1133 WBC, UA(!): 11-15 [AG]   1133 Bacteria, UA(!): Many [AG]   1135 Treating UTI with IV rocephin.  Pt has had rocephin in the past and done well with the medication.  [AG]   1206 POC Glucose(!): 453 [AG]   1219 Getting repeat BMP after the IVFs and insulin. Condition stable.  [AG]   1228 BMP running at this time.  [AG]   1246 Potassium(!): 5.5 [AG]   1247 CO2(!): 17 [AG]   1247 Anion Gap(!): 19 [AG]   1247 Glucose(!): 397 [AG]   1247 BUN(!): 103 [AG]   1247 Creatinine(!): 3.04 [AG]   1247 eGFR(!): 21 [AG]   1250 BMP has improved.  Insulin 5 units ordered.  Will have base fluids going and repeat BMP 1 hour after the insulin is  administered.  [AG]   1302 Patient is not wanting to be admitted to the hospital.   [AG]   1441 Pt continues to refuse admission.  Will repeat insulin 5units, give kayexalate.  He is willing to stay longer to recheck lab in a hour after these meds.  [AG]   1640 Patient reports he is ready to go home.  I have reviewed his lab with him and explained the concern for the prednisone and the UTI causing his glucose to elevate.  I have stressed the importance of him following up with a family doctor or practitioner this week.  I have given very strict return precautions for him.  He will monitor glucose at home, follow a diabetic diet and return PRN.  [AG]      ED Course User Index  [AG] Adele Whiting FNP                           Clinical Impression:   Final diagnoses:  [E87.5] Hyperkalemia  [I45.10] RBBB  [N30.01] Acute cystitis with hematuria  [S01.01XA] Laceration of scalp, initial encounter  [W19.XXXA] Fall, initial encounter  [R73.9] Hyperglycemia (Primary)  [N17.9, N18.9] Acute kidney injury superimposed on CKD        ED Disposition Condition    Discharge Stable          ED Prescriptions       Medication Sig Dispense Start Date End Date Auth. Provider    cephALEXin (KEFLEX) 500 MG capsule Take 1 capsule (500 mg total) by mouth every 8 (eight) hours. for 7 days 21 capsule 9/2/2024 9/9/2024 Adele Whiting FNP          Follow-up Information       Follow up With Specialties Details Why Contact Info    [facility]  Call in 1 day schedule an appointment for this week. [address] [place] [id number] [phone]             Adele Whiting FNP  09/02/24 8248

## 2024-09-04 LAB
HCO3 UR-SCNC: 18.4 MMOL/L (ref 24–28)
PCO2 BLDA: 40 MMHG (ref 41–51)
PH SMN: 7.27 [PH] (ref 7.32–7.42)
PO2 BLDA: 34 MMHG (ref 25–40)
POC BASE EXCESS: -8 MMOL/L (ref -2–3)
POC CO2: 19.6 MMOL/L
POC SATURATED O2: ABNORMAL %

## 2024-09-05 LAB — UA COMPLETE W REFLEX CULTURE PNL UR: ABNORMAL

## 2024-11-18 ENCOUNTER — OFFICE VISIT (OUTPATIENT)
Dept: FAMILY MEDICINE | Facility: CLINIC | Age: 69
End: 2024-11-18
Payer: MEDICARE

## 2024-11-18 VITALS
DIASTOLIC BLOOD PRESSURE: 70 MMHG | BODY MASS INDEX: 25.74 KG/M2 | WEIGHT: 164 LBS | HEIGHT: 67 IN | SYSTOLIC BLOOD PRESSURE: 133 MMHG | HEART RATE: 67 BPM

## 2024-11-18 DIAGNOSIS — E78.5 DYSLIPIDEMIA: Primary | Chronic | ICD-10-CM

## 2024-11-18 DIAGNOSIS — I63.9 CEREBROVASCULAR ACCIDENT (CVA), UNSPECIFIED MECHANISM: ICD-10-CM

## 2024-11-18 DIAGNOSIS — N18.4 CKD (CHRONIC KIDNEY DISEASE) STAGE 4, GFR 15-29 ML/MIN: ICD-10-CM

## 2024-11-18 DIAGNOSIS — N39.46 MIXED STRESS AND URGE URINARY INCONTINENCE: ICD-10-CM

## 2024-11-18 DIAGNOSIS — I10 PRIMARY HYPERTENSION: ICD-10-CM

## 2024-11-18 DIAGNOSIS — M79.2 NEUROPATHIC PAIN: ICD-10-CM

## 2024-11-18 PROCEDURE — 99203 OFFICE O/P NEW LOW 30 MIN: CPT | Mod: ,,, | Performed by: FAMILY MEDICINE

## 2024-11-18 PROCEDURE — 1101F PT FALLS ASSESS-DOCD LE1/YR: CPT | Mod: CPTII,,, | Performed by: FAMILY MEDICINE

## 2024-11-18 PROCEDURE — 3288F FALL RISK ASSESSMENT DOCD: CPT | Mod: CPTII,,, | Performed by: FAMILY MEDICINE

## 2024-11-18 PROCEDURE — 1125F AMNT PAIN NOTED PAIN PRSNT: CPT | Mod: CPTII,,, | Performed by: FAMILY MEDICINE

## 2024-11-18 PROCEDURE — 1159F MED LIST DOCD IN RCRD: CPT | Mod: CPTII,,, | Performed by: FAMILY MEDICINE

## 2024-11-18 PROCEDURE — 3078F DIAST BP <80 MM HG: CPT | Mod: CPTII,,, | Performed by: FAMILY MEDICINE

## 2024-11-18 PROCEDURE — 3075F SYST BP GE 130 - 139MM HG: CPT | Mod: CPTII,,, | Performed by: FAMILY MEDICINE

## 2024-11-18 PROCEDURE — 3008F BODY MASS INDEX DOCD: CPT | Mod: CPTII,,, | Performed by: FAMILY MEDICINE

## 2024-11-18 RX ORDER — PREGABALIN 150 MG/1
150 CAPSULE ORAL
Qty: 180 CAPSULE | Refills: 1 | Status: SHIPPED | OUTPATIENT
Start: 2024-11-18 | End: 2025-05-17

## 2024-11-18 RX ORDER — CLOPIDOGREL BISULFATE 75 MG/1
75 TABLET ORAL DAILY
Qty: 90 TABLET | Refills: 1 | Status: SHIPPED | OUTPATIENT
Start: 2024-11-18 | End: 2025-05-17

## 2024-11-18 RX ORDER — ASPIRIN 81 MG/1
81 TABLET ORAL DAILY
Qty: 30 TABLET | Refills: 5 | Status: SHIPPED | OUTPATIENT
Start: 2024-11-18 | End: 2025-05-17

## 2024-11-18 RX ORDER — AMLODIPINE BESYLATE 10 MG/1
10 TABLET ORAL DAILY
Qty: 90 TABLET | Refills: 1 | Status: SHIPPED | OUTPATIENT
Start: 2024-11-18 | End: 2025-05-17

## 2024-11-18 RX ORDER — OXYBUTYNIN CHLORIDE 5 MG/1
5 TABLET, EXTENDED RELEASE ORAL NIGHTLY
Qty: 90 TABLET | Refills: 1 | Status: SHIPPED | OUTPATIENT
Start: 2024-11-18 | End: 2025-05-17

## 2024-11-18 RX ORDER — ATORVASTATIN CALCIUM 40 MG/1
40 TABLET, FILM COATED ORAL DAILY
Qty: 90 TABLET | Refills: 1 | Status: SHIPPED | OUTPATIENT
Start: 2024-11-18 | End: 2025-05-17

## 2024-11-18 RX ORDER — OXYBUTYNIN CHLORIDE 5 MG/1
1 TABLET, EXTENDED RELEASE ORAL NIGHTLY
COMMUNITY
Start: 2024-03-25 | End: 2024-11-18 | Stop reason: SDUPTHER

## 2024-11-18 NOTE — PROGRESS NOTES
"              Dominic Espinoza IV, DO  [facility] [place]  [address], [place] [address]  Phone: [phone]      Subjective     Name: Chandan Gutierrez   Sex: male  YOB: 1955 (69 y.o.)  MRN: [id number]  Visit Date: 11/18/2024   Chief Complaint: Establish Care        HISTORY OF PRESENT ILLNESS:    Chief Complaint   Patient presents with    Establish Care       HPI  See tests that keep you healthy and the problem oriented documentation below.    Tests to Keep You Healthy    Colon Cancer Screening: DUE  Last Blood Pressure <= 139/89 (11/18/2024): NO      Portions of this note may have been created with voice recognition software. Occasional "wrong-word" or "sound-a-like" substitutions may have occurred due to the inherent limitations of voice recognition software. Please, read the note carefully and recognize, using context, where substitutions have occurred.     PAST MEDICAL HISTORY:  Significant Diagnoses - Patient  has a past medical history of Hypertension, Renal disorder, and Stroke.  Medications - Patient has a current medication list which includes the following long-term medication(s): amlodipine, aspirin, atorvastatin, clopidogrel, oxybutynin, and pregabalin.   Allergies - Patient is allergic to codeine, oxycodone, and penicillin.  Surgeries - Patient  has a past surgical history that includes Cardiac surgery and Eye surgery.  Family History - Patient family history is not on file.      SOCIAL HISTORY:  Tobacco - Patient  reports that he has never smoked. He has never been exposed to tobacco smoke. He has never used smokeless tobacco.   Alcohol - Patient  has no history on file for alcohol use.   Recreational Drugs - Patient  reports no history of drug use.       Review of Systems   All other systems reviewed and are negative.       Past Medical History:   Diagnosis Date    Hypertension     Renal disorder     Stroke         Review of patient's allergies indicates: " "  Allergen Reactions    Codeine     Oxycodone     Penicillin         Past Surgical History:   Procedure Laterality Date    CARDIAC SURGERY      EYE SURGERY          History reviewed. No pertinent family history.    Current Outpatient Medications   Medication Instructions    amLODIPine (NORVASC) 10 mg, Oral, Daily    aspirin (ECOTRIN) 81 mg, Oral, Daily    atorvastatin (LIPITOR) 40 mg, Oral, Daily    clopidogreL (PLAVIX) 75 mg, Oral, Daily    oxybutynin (DITROPAN-XL) 5 mg, Oral, Nightly    pregabalin (LYRICA) 150 mg, Oral, 2 times daily before meals        Objective     /70   Pulse 67   Ht 5' 7" (1.702 m)   Wt 74.4 kg (164 lb)   BMI 25.69 kg/m²     Physical Exam  Constitutional:       General: He is not in acute distress.     Appearance: Normal appearance. He is not ill-appearing.   HENT:      Head: Normocephalic and atraumatic.      Right Ear: External ear normal.      Left Ear: External ear normal.      Nose: Nose normal.   Eyes:      Extraocular Movements: Extraocular movements intact.   Cardiovascular:      Rate and Rhythm: Normal rate.   Pulmonary:      Effort: Pulmonary effort is normal.   Abdominal:      Palpations: Abdomen is soft.   Musculoskeletal:      Cervical back: Normal range of motion. No tenderness.   Neurological:      Mental Status: He is alert.   Psychiatric:         Mood and Affect: Mood normal.         Behavior: Behavior normal.        All recently obtained labs have been reviewed and discussed in detail with the patient.   Assessment     1. Dyslipidemia    2. Cerebrovascular accident (CVA), unspecified mechanism    3. Mixed stress and urge urinary incontinence    4. Neuropathic pain    5. Primary hypertension    6. CKD (chronic kidney disease) stage 4, GFR 15-29 ml/min         Plan        Problem List Items Addressed This Visit       CKD (chronic kidney disease) stage 4, GFR 15-29 ml/min    Relevant Orders    Microalbumin/Creatinine Ratio, Urine    Dyslipidemia - Primary " (Chronic)    Relevant Medications    atorvastatin (LIPITOR) 40 MG tablet    Primary hypertension    Relevant Medications    amLODIPine (NORVASC) 10 MG tablet    Stroke    Relevant Medications    clopidogreL (PLAVIX) 75 mg tablet     Other Visit Diagnoses       Mixed stress and urge urinary incontinence        Relevant Medications    oxybutynin (DITROPAN-XL) 5 MG TR24    Neuropathic pain        Relevant Medications    pregabalin (LYRICA) 150 MG capsule            No follow-ups on file.    Patient advised that is symptoms worsen, they should call or report directly to local emergency department.    Dominic Espinoza,   [facility]

## 2024-11-26 DIAGNOSIS — M79.2 NEUROPATHIC PAIN: ICD-10-CM

## 2024-11-26 RX ORDER — PREGABALIN 150 MG/1
150 CAPSULE ORAL
Qty: 20 CAPSULE | Refills: 0 | Status: SHIPPED | OUTPATIENT
Start: 2024-11-26 | End: 2024-12-06

## 2024-12-02 DIAGNOSIS — F51.01 PRIMARY INSOMNIA: Primary | ICD-10-CM

## 2024-12-02 DIAGNOSIS — M79.2 NEUROPATHIC PAIN: ICD-10-CM

## 2024-12-02 RX ORDER — PREGABALIN 150 MG/1
CAPSULE ORAL
Refills: 0 | OUTPATIENT
Start: 2024-12-02

## 2024-12-02 RX ORDER — ESZOPICLONE 1 MG/1
1 TABLET, FILM COATED ORAL NIGHTLY
Qty: 30 TABLET | Refills: 0 | OUTPATIENT
Start: 2024-12-02 | End: 2025-01-01

## 2024-12-03 RX ORDER — PREGABALIN 150 MG/1
150 CAPSULE ORAL
Qty: 20 CAPSULE | Refills: 0 | Status: SHIPPED | OUTPATIENT
Start: 2024-12-03 | End: 2024-12-13

## 2024-12-03 RX ORDER — PREGABALIN 150 MG/1
150 CAPSULE ORAL
Qty: 20 CAPSULE | Refills: 0 | Status: SHIPPED | OUTPATIENT
Start: 2024-12-03 | End: 2024-12-03

## 2024-12-23 DIAGNOSIS — N39.46 MIXED STRESS AND URGE URINARY INCONTINENCE: ICD-10-CM

## 2024-12-23 DIAGNOSIS — M79.2 NEUROPATHIC PAIN: ICD-10-CM

## 2024-12-23 DIAGNOSIS — E78.5 DYSLIPIDEMIA: Chronic | ICD-10-CM

## 2024-12-23 DIAGNOSIS — I63.9 CEREBROVASCULAR ACCIDENT (CVA), UNSPECIFIED MECHANISM: ICD-10-CM

## 2024-12-23 DIAGNOSIS — I10 PRIMARY HYPERTENSION: ICD-10-CM

## 2024-12-23 RX ORDER — AMLODIPINE BESYLATE 10 MG/1
10 TABLET ORAL DAILY
Qty: 90 TABLET | Refills: 1 | Status: SHIPPED | OUTPATIENT
Start: 2024-12-23 | End: 2025-06-21

## 2024-12-23 RX ORDER — OXYBUTYNIN CHLORIDE 5 MG/1
5 TABLET, EXTENDED RELEASE ORAL NIGHTLY
Qty: 90 TABLET | Refills: 1 | Status: SHIPPED | OUTPATIENT
Start: 2024-12-23 | End: 2025-06-21

## 2024-12-23 RX ORDER — PREGABALIN 150 MG/1
150 CAPSULE ORAL
Qty: 20 CAPSULE | Refills: 0 | Status: SHIPPED | OUTPATIENT
Start: 2024-12-23 | End: 2025-01-02

## 2024-12-23 RX ORDER — CLOPIDOGREL BISULFATE 75 MG/1
75 TABLET ORAL DAILY
Qty: 90 TABLET | Refills: 1 | Status: SHIPPED | OUTPATIENT
Start: 2024-12-23 | End: 2025-06-21

## 2024-12-23 RX ORDER — ATORVASTATIN CALCIUM 40 MG/1
40 TABLET, FILM COATED ORAL DAILY
Qty: 90 TABLET | Refills: 1 | Status: SHIPPED | OUTPATIENT
Start: 2024-12-23 | End: 2025-06-21

## 2025-01-01 ENCOUNTER — HOSPITAL ENCOUNTER (EMERGENCY)
Facility: HOSPITAL | Age: 70
Discharge: ANOTHER HOSPITAL | End: 2025-07-12
Payer: MEDICARE

## 2025-01-01 ENCOUNTER — HOSPITAL ENCOUNTER (INPATIENT)
Facility: HOSPITAL | Age: 70
LOS: 1 days | DRG: 641 | End: 2025-07-12
Attending: INTERNAL MEDICINE | Admitting: INTERNAL MEDICINE
Payer: MEDICARE

## 2025-01-01 VITALS
WEIGHT: 180 LBS | HEIGHT: 69 IN | RESPIRATION RATE: 18 BRPM | DIASTOLIC BLOOD PRESSURE: 55 MMHG | HEART RATE: 65 BPM | TEMPERATURE: 97 F | SYSTOLIC BLOOD PRESSURE: 81 MMHG | BODY MASS INDEX: 26.66 KG/M2 | OXYGEN SATURATION: 84 %

## 2025-01-01 VITALS
HEART RATE: 30 BPM | DIASTOLIC BLOOD PRESSURE: 21 MMHG | OXYGEN SATURATION: 51 % | SYSTOLIC BLOOD PRESSURE: 49 MMHG | TEMPERATURE: 93 F

## 2025-01-01 DIAGNOSIS — E08.00 DIABETES MELLITUS DUE TO UNDERLYING CONDITION WITH HYPEROSMOLARITY WITHOUT NONKETOTIC HYPERGLYCEMIC-HYPEROSMOLAR COMA (NKHHC): ICD-10-CM

## 2025-01-01 DIAGNOSIS — E11.10 LACTIC ACIDOSIS DUE TO DIABETES MELLITUS: Primary | ICD-10-CM

## 2025-01-01 DIAGNOSIS — R73.9 HYPERGLYCEMIA: ICD-10-CM

## 2025-01-01 DIAGNOSIS — R00.0 HEART RATE FAST: ICD-10-CM

## 2025-01-01 DIAGNOSIS — A41.9 SEPSIS, DUE TO UNSPECIFIED ORGANISM, UNSPECIFIED WHETHER ACUTE ORGAN DYSFUNCTION PRESENT: ICD-10-CM

## 2025-01-01 DIAGNOSIS — R06.03 RESPIRATORY DISTRESS: ICD-10-CM

## 2025-01-01 DIAGNOSIS — I95.9 HYPOTENSION, UNSPECIFIED HYPOTENSION TYPE: ICD-10-CM

## 2025-01-01 DIAGNOSIS — E11.10 DKA (DIABETIC KETOACIDOSIS): ICD-10-CM

## 2025-01-01 DIAGNOSIS — E13.11 DIABETIC KETOACIDOSIS WITH COMA ASSOCIATED WITH OTHER SPECIFIED DIABETES MELLITUS: Primary | ICD-10-CM

## 2025-01-01 DIAGNOSIS — N17.9 ACUTE RENAL FAILURE, UNSPECIFIED ACUTE RENAL FAILURE TYPE: ICD-10-CM

## 2025-01-01 LAB
ACETONE SERPL QL SCN: NEGATIVE
ALBUMIN SERPL BCP-MCNC: 3.1 G/DL (ref 3.4–4.8)
ALBUMIN/GLOB SERPL: 0.8 {RATIO}
ALP SERPL-CCNC: 228 U/L (ref 40–150)
ALT SERPL W P-5'-P-CCNC: 47 U/L
ANION GAP SERPL CALCULATED.3IONS-SCNC: 37 MMOL/L (ref 7–16)
AST SERPL W P-5'-P-CCNC: 46 U/L (ref 11–45)
BACTERIA #/AREA URNS HPF: ABNORMAL /HPF
BASOPHILS # BLD AUTO: 0.04 K/UL (ref 0–0.2)
BASOPHILS NFR BLD AUTO: 0.3 % (ref 0–1)
BILIRUB SERPL-MCNC: 1.1 MG/DL
BILIRUB UR QL STRIP: ABNORMAL
BUN SERPL-MCNC: 145 MG/DL (ref 8–26)
BUN/CREAT SERPL: 16 (ref 6–20)
CALCIUM SERPL-MCNC: 9.6 MG/DL (ref 8.8–10)
CHLORIDE SERPL-SCNC: 98 MMOL/L (ref 98–107)
CLARITY UR: ABNORMAL
CO2 SERPL-SCNC: 8 MMOL/L (ref 23–31)
COLOR UR: YELLOW
CREAT SERPL-MCNC: 8.83 MG/DL (ref 0.72–1.25)
DIFFERENTIAL METHOD BLD: ABNORMAL
EGFR (NO RACE VARIABLE) (RUSH/TITUS): 6 ML/MIN/1.73M2
EOSINOPHIL # BLD AUTO: 0 K/UL (ref 0–0.5)
EOSINOPHIL NFR BLD AUTO: 0 % (ref 1–4)
ERYTHROCYTE [DISTWIDTH] IN BLOOD BY AUTOMATED COUNT: 16.5 % (ref 11.5–14.5)
GLOBULIN SER-MCNC: 4 G/DL (ref 2–4)
GLUCOSE SERPL-MCNC: 960 MG/DL (ref 82–115)
GLUCOSE UR STRIP-MCNC: 500 MG/DL
HCO3 UR-SCNC: 8.1 MMOL/L (ref 21–28)
HCT VFR BLD AUTO: 53.3 % (ref 40–54)
HGB BLD-MCNC: 17.4 G/DL (ref 13.5–18)
IMM GRANULOCYTES # BLD AUTO: 0.14 K/UL (ref 0–0.04)
IMM GRANULOCYTES NFR BLD: 1 % (ref 0–0.4)
KETONES UR STRIP-SCNC: ABNORMAL MG/DL
LACTATE SERPL-SCNC: 10.9 MMOL/L (ref 0.5–2.2)
LACTATE SERPL-SCNC: 9.1 MMOL/L (ref 0.5–2.2)
LEUKOCYTE ESTERASE UR QL STRIP: ABNORMAL
LYMPHOCYTES # BLD AUTO: 1.97 K/UL (ref 1–4.8)
LYMPHOCYTES NFR BLD AUTO: 13.5 % (ref 27–41)
MCH RBC QN AUTO: 27.6 PG (ref 27–31)
MCHC RBC AUTO-ENTMCNC: 32.6 G/DL (ref 32–36)
MCV RBC AUTO: 84.5 FL (ref 80–96)
MONOCYTES # BLD AUTO: 0.99 K/UL (ref 0–0.8)
MONOCYTES NFR BLD AUTO: 6.8 % (ref 2–6)
MPC BLD CALC-MCNC: 13.9 FL (ref 9.4–12.4)
NEUTROPHILS # BLD AUTO: 11.49 K/UL (ref 1.8–7.7)
NEUTROPHILS NFR BLD AUTO: 78.4 % (ref 53–65)
NITRITE UR QL STRIP: NEGATIVE
NRBC # BLD AUTO: 0 X10E3/UL
NRBC, AUTO (.00): 0 %
PCO2 BLDA: 30 MMHG (ref 35–48)
PH SMN: 7.04 [PH] (ref 7.35–7.45)
PH UR STRIP: 5.5 PH UNITS
PLATELET # BLD AUTO: 288 K/UL (ref 150–400)
PLATELET MORPHOLOGY: ABNORMAL
PO2 BLDA: 56 MMHG (ref 83–108)
POC BASE EXCESS: -21.4 MMOL/L (ref -2–3)
POC CO2: 9 MMOL/L
POC SATURATED O2: 71 %
POTASSIUM SERPL-SCNC: 5.7 MMOL/L (ref 3.5–5.1)
PROT SERPL-MCNC: 7.1 G/DL (ref 5.8–7.6)
PROT UR QL STRIP: 30
RBC # BLD AUTO: 6.31 M/UL (ref 4.6–6.2)
RBC # UR STRIP: ABNORMAL /UL
RBC #/AREA URNS HPF: ABNORMAL /HPF
RBC MORPH BLD: NORMAL
SODIUM SERPL-SCNC: 137 MMOL/L (ref 136–145)
SP GR UR STRIP: 1.01
SQUAMOUS #/AREA URNS LPF: ABNORMAL /LPF
TROPONIN I SERPL HS-MCNC: 67.2 NG/L
UROBILINOGEN UR STRIP-ACNC: 0.2 MG/DL
WBC # BLD AUTO: 14.63 K/UL (ref 4.5–11)
WBC #/AREA URNS HPF: ABNORMAL /HPF

## 2025-01-01 PROCEDURE — 81003 URINALYSIS AUTO W/O SCOPE: CPT | Performed by: NURSE PRACTITIONER

## 2025-01-01 PROCEDURE — 96374 THER/PROPH/DIAG INJ IV PUSH: CPT

## 2025-01-01 PROCEDURE — 82803 BLOOD GASES ANY COMBINATION: CPT

## 2025-01-01 PROCEDURE — 25000003 PHARM REV CODE 250: Performed by: NURSE PRACTITIONER

## 2025-01-01 PROCEDURE — 94660 CPAP INITIATION&MGMT: CPT

## 2025-01-01 PROCEDURE — 99285 EMERGENCY DEPT VISIT HI MDM: CPT | Mod: 25

## 2025-01-01 PROCEDURE — 99285 EMERGENCY DEPT VISIT HI MDM: CPT | Mod: GF,EDII,, | Performed by: NURSE PRACTITIONER

## 2025-01-01 PROCEDURE — 36600 WITHDRAWAL OF ARTERIAL BLOOD: CPT

## 2025-01-01 PROCEDURE — 84484 ASSAY OF TROPONIN QUANT: CPT | Performed by: NURSE PRACTITIONER

## 2025-01-01 PROCEDURE — 27100080 HC AIRWAY ADAPTER-END TIDAL CO2

## 2025-01-01 PROCEDURE — 99900035 HC TECH TIME PER 15 MIN (STAT)

## 2025-01-01 PROCEDURE — 93005 ELECTROCARDIOGRAM TRACING: CPT

## 2025-01-01 PROCEDURE — 20000000 HC ICU ROOM

## 2025-01-01 PROCEDURE — 96361 HYDRATE IV INFUSION ADD-ON: CPT

## 2025-01-01 PROCEDURE — 87186 SC STD MICRODIL/AGAR DIL: CPT | Performed by: NURSE PRACTITIONER

## 2025-01-01 PROCEDURE — 51702 INSERT TEMP BLADDER CATH: CPT

## 2025-01-01 PROCEDURE — 27000190 HC CPAP FULL FACE MASK W/VALVE

## 2025-01-01 PROCEDURE — 96375 TX/PRO/DX INJ NEW DRUG ADDON: CPT

## 2025-01-01 PROCEDURE — 82009 KETONE BODYS QUAL: CPT | Performed by: NURSE PRACTITIONER

## 2025-01-01 PROCEDURE — 27000221 HC OXYGEN, UP TO 24 HOURS

## 2025-01-01 PROCEDURE — 99291 CRITICAL CARE FIRST HOUR: CPT | Mod: ,,, | Performed by: HOSPITALIST

## 2025-01-01 PROCEDURE — 93010 ELECTROCARDIOGRAM REPORT: CPT | Performed by: HOSPITALIST

## 2025-01-01 PROCEDURE — 85025 COMPLETE CBC W/AUTO DIFF WBC: CPT | Performed by: NURSE PRACTITIONER

## 2025-01-01 PROCEDURE — 27100108

## 2025-01-01 PROCEDURE — 80053 COMPREHEN METABOLIC PANEL: CPT | Performed by: NURSE PRACTITIONER

## 2025-01-01 PROCEDURE — 83605 ASSAY OF LACTIC ACID: CPT | Performed by: NURSE PRACTITIONER

## 2025-01-01 RX ORDER — NOREPINEPHRINE BITARTRATE/D5W 4MG/250ML
0-3 PLASTIC BAG, INJECTION (ML) INTRAVENOUS CONTINUOUS
Status: DISCONTINUED | OUTPATIENT
Start: 2025-01-01 | End: 2025-01-01 | Stop reason: HOSPADM

## 2025-01-01 RX ORDER — SODIUM BICARBONATE 1 MEQ/ML
50 SYRINGE (ML) INTRAVENOUS ONCE
Status: DISCONTINUED | OUTPATIENT
Start: 2025-01-01 | End: 2025-07-13 | Stop reason: HOSPADM

## 2025-01-01 RX ORDER — SODIUM CHLORIDE 0.9 % (FLUSH) 0.9 %
10 SYRINGE (ML) INJECTION
Status: DISCONTINUED | OUTPATIENT
Start: 2025-01-01 | End: 2025-01-01 | Stop reason: HOSPADM

## 2025-01-01 RX ORDER — CEFEPIME HYDROCHLORIDE 1 G/1
1 INJECTION, POWDER, FOR SOLUTION INTRAMUSCULAR; INTRAVENOUS
Status: DISCONTINUED | OUTPATIENT
Start: 2025-01-01 | End: 2025-07-13 | Stop reason: HOSPADM

## 2025-01-01 RX ORDER — DEXTROSE MONOHYDRATE AND SODIUM CHLORIDE 5; .45 G/100ML; G/100ML
INJECTION, SOLUTION INTRAVENOUS CONTINUOUS PRN
Status: DISCONTINUED | OUTPATIENT
Start: 2025-01-01 | End: 2025-01-01 | Stop reason: HOSPADM

## 2025-01-01 RX ORDER — SODIUM CHLORIDE 9 MG/ML
1000 INJECTION, SOLUTION INTRAVENOUS CONTINUOUS
Status: DISCONTINUED | OUTPATIENT
Start: 2025-01-01 | End: 2025-01-01 | Stop reason: HOSPADM

## 2025-01-01 RX ADMIN — NOREPINEPHRINE BITARTRATE 0.05 MCG/KG/MIN: 4 INJECTION, SOLUTION INTRAVENOUS at 05:07

## 2025-01-01 RX ADMIN — SODIUM CHLORIDE 1000 ML: 9 INJECTION, SOLUTION INTRAVENOUS at 03:07

## 2025-01-01 RX ADMIN — SODIUM CHLORIDE 1000 ML: 9 INJECTION, SOLUTION INTRAVENOUS at 05:07

## 2025-01-01 RX ADMIN — INSULIN HUMAN 0.1 UNITS/KG/HR: 1 INJECTION, SOLUTION INTRAVENOUS at 05:07

## 2025-01-06 DIAGNOSIS — M79.2 NEUROPATHIC PAIN: ICD-10-CM

## 2025-01-06 RX ORDER — PREGABALIN 150 MG/1
150 CAPSULE ORAL
Qty: 20 CAPSULE | Refills: 0 | Status: SHIPPED | OUTPATIENT
Start: 2025-01-06 | End: 2025-01-16

## 2025-01-06 RX ORDER — PREGABALIN 150 MG/1
150 CAPSULE ORAL 2 TIMES DAILY
Qty: 180 CAPSULE | Refills: 3 | Status: SHIPPED | OUTPATIENT
Start: 2025-01-26 | End: 2026-01-26

## 2025-01-16 ENCOUNTER — TELEPHONE (OUTPATIENT)
Dept: FAMILY MEDICINE | Facility: CLINIC | Age: 70
End: 2025-01-16
Payer: MEDICARE

## 2025-07-12 PROBLEM — E11.10 LACTIC ACIDOSIS DUE TO DIABETES MELLITUS: Status: ACTIVE | Noted: 2025-01-01

## 2025-07-12 PROBLEM — E08.00 DIABETES MELLITUS DUE TO UNDERLYING CONDITION WITH HYPEROSMOLARITY WITHOUT NONKETOTIC HYPERGLYCEMIC-HYPEROSMOLAR COMA (NKHHC): Status: ACTIVE | Noted: 2025-01-01

## 2025-07-12 PROBLEM — A41.9 SEPSIS: Status: ACTIVE | Noted: 2025-01-01

## 2025-07-12 NOTE — ED PROVIDER NOTES
Encounter Date: 7/12/2025       History     Chief Complaint   Patient presents with    Altered Mental Status     Unresponsive, hyperglycemic, unknown down time     EMS presented patient to the ED for unresponsive state, BG reading HI.     The history is limited by the condition of the patient.     Review of patient's allergies indicates:   Allergen Reactions    Codeine     Oxycodone     Penicillin      Past Medical History:   Diagnosis Date    Hypertension     Renal disorder     Stroke      Past Surgical History:   Procedure Laterality Date    CARDIAC SURGERY      EYE SURGERY       No family history on file.  Social History[1]  Review of Systems   Unable to perform ROS: Patient unresponsive   All other systems reviewed and are negative.      Physical Exam     Initial Vitals [07/12/25 1611]   BP Pulse Resp Temp SpO2   (!) 81/43 100 (!) 48 97.9 °F (36.6 °C) 98 %      MAP       --         Physical Exam    Constitutional: He appears toxic.   Eyes: Right pupil is not reactive. Left pupil is not reactive.   Cardiovascular:  Normal rate, regular rhythm and normal pulses.           Pulmonary/Chest: Breath sounds normal. Tachypnea noted. He is in respiratory distress.   Abdominal: Abdomen is soft and flat.   Musculoskeletal:      Comments: Right BKA     Neurological: He is unresponsive. GCS eye subscore is 1. GCS verbal subscore is 1. GCS motor subscore is 4.   Skin: Capillary refill takes 2 to 3 seconds.        Psychiatric: He is noncommunicative.         Medical Screening Exam   See Full Note    ED Course   Procedures  Labs Reviewed   COMPREHENSIVE METABOLIC PANEL - Abnormal       Result Value    Sodium 137      Potassium 5.7 (*)     Chloride 98      CO2 8 (*)     Anion Gap 37 (*)     Glucose 960 (*)      (*)     Creatinine 8.83 (*)     BUN/Creatinine Ratio 16      Calcium 9.6      Total Protein 7.1      Albumin 3.1 (*)     Globulin 4.0      A/G Ratio 0.8      Bilirubin, Total 1.1      Alk Phos 228 (*)     ALT 47       AST 46 (*)     eGFR 6 (*)    URINALYSIS, REFLEX TO URINE CULTURE - Abnormal    Color, UA Yellow      Clarity, UA Cloudy      pH, UA 5.5      Leukocytes, UA Small (*)     Nitrites, UA Negative      Protein, UA 30 (*)     Glucose,  (*)     Ketones, UA Trace      Urobilinogen, UA 0.2      Bilirubin, UA Small (*)     Blood, UA Moderate (*)     Specific Marcell, UA 1.015     TROPONIN I - Abnormal    Troponin I High Sensitivity 67.2 (*)    LACTIC ACID, PLASMA - Abnormal    Lactic Acid 9.1 (*)    CBC WITH DIFFERENTIAL - Abnormal    WBC 14.63 (*)     RBC 6.31 (*)     Hemoglobin 17.4      Hematocrit 53.3      MCV 84.5      MCH 27.6      MCHC 32.6      RDW 16.5 (*)     Platelet Count 288      MPV 13.9 (*)     Neutrophils % 78.4 (*)     Lymphocytes % 13.5 (*)     Monocytes % 6.8 (*)     Eosinophils % 0.0 (*)     Basophils % 0.3      Immature Granulocytes % 1.0 (*)     nRBC, Auto 0.0      Neutrophils, Abs 11.49 (*)     Lymphocytes, Absolute 1.97      Monocytes, Absolute 0.99 (*)     Eosinophils, Absolute 0.00      Basophils, Absolute 0.04      Immature Granulocytes, Absolute 0.14 (*)     nRBC, Absolute 0.00      Diff Type Scan Smear     CBC MORPHOLOGY - Abnormal    Platelet Morphology Large & Giant Platelets (*)     RBC Morphology Normal     LACTIC ACID, PLASMA - Abnormal    Lactic Acid 10.9 (*)    URINALYSIS, MICROSCOPIC - Abnormal    WBC, UA 15-25 (*)     RBC, UA 0-3      Bacteria, UA Moderate (*)     Squamous Epithelial Cells, UA Few (*)    CULTURE, URINE   CBC W/ AUTO DIFFERENTIAL    Narrative:     The following orders were created for panel order CBC auto differential.  Procedure                               Abnormality         Status                     ---------                               -----------         ------                     CBC with Differential[[id number]]       Abnormal            Final result                 Please view results for these tests on the individual orders.   ACETONE    Acetone  Negative     LACTIC ACID, PLASMA   POCT GLUCOSE MONITORING CONTINUOUS   POCT GLUCOSE MONITORING CONTINUOUS          Imaging Results              X-Ray Chest AP Portable (Final result)  Result time 07/12/25 18:57:30      Final result by Mainor Bianchi MD (07/12/25 18:57:30)                   Impression:      No acute radiographic abnormality.      Electronically signed by: Mainor Bianchi  Date:    07/12/2025  Time:    18:57               Narrative:    EXAMINATION:  XR CHEST AP PORTABLE    CLINICAL HISTORY:  hyperglycemia;    TECHNIQUE:  Single frontal view of the chest was performed.    COMPARISON:  10/03/2023    FINDINGS:  The patient is tilted to the left with suboptimal inspiration.    The lungs are clear, with normal appearance of pulmonary vasculature and no pleural effusion or pneumothorax.    The cardiac silhouette is normal in size. The hilar and mediastinal contours are unremarkable.    Bones are intact.                                       Medications   sodium chloride 0.9% bolus 1,000 mL 1,000 mL (0 mLs Intravenous Stopped 7/12/25 1630)   insulin regular bolus from bag/infusion 8.16 Units 8.16 mL (8.16 Units Intravenous Bolus from Bag 7/12/25 1724)     Medical Decision Making  Upon arrival to the ED, it was verified with patient's daughter (next of kin) that patient does not wish to be intubated or have CPR. Daughter states patient's expressed his wishes when he was in the hospital the last time and stated he wanted to be a DNR. Daughter states she is okay with CPAP, medications, treating patient but nothing invasive.     MDM    Patient presents for emergent evaluation of acute unresponsiveness that poses a threat to life and/or bodily function.    In the ED patient found to have acute diabetic coma with ketoacidosis, kidney failure, elevated lactic acid, respiratory distress, hypotensive.    I ordered labs and personally reviewed them.  Labs significant for WBC 14.63, H/H 17.4/53.5, Platelets 288, Na  137, K 5.7, Cl 98, CO2 8, , Creat 8.83, Glucose 960, Troponin 67.2, Lactic 9.1, negative acetone, small leukocytes with 15-25 WBCs in urine. 7.04 pH, PCO2 30, HCO3 8.1.   I ordered X-rays and personally reviewed them and reviewed the radiologist interpretation.  Chest Xray significant for no acute abnormality.      MDM  Patient transferred to [facility] ICU, Dr. Keyes accepted patient.     Amount and/or Complexity of Data Reviewed  Labs: ordered.  Radiology: ordered.    Risk  Prescription drug management.                                      Clinical Impression:   Final diagnoses:  [R73.9] Hyperglycemia  [E13.11] Diabetic ketoacidosis with coma associated with other specified diabetes mellitus (Primary)  [I95.9] Hypotension, unspecified hypotension type  [R06.03] Respiratory distress  [N17.9] Acute renal failure, unspecified acute renal failure type        ED Disposition Condition    Transfer to Another Facility Stable                    [1]   Social History  Tobacco Use    Smoking status: Never     Passive exposure: Never    Smokeless tobacco: Never   Substance Use Topics    Drug use: Never        Michelle Dotson FNP  07/12/25 2018

## 2025-07-13 NOTE — H&P
70 yo M transferred from [place] for NKHS and severe metabolic acidosis.  Patient has long history of illness.  BA nearly twenty years ago in an accident and apparently slid out of his chair Wednesday night when his daughter (a nurse in the ED [facility]) went to check on him.  He had caught his LUE in the chair but had not hit his head.      Patient was fine when daughter saw him on Wednesday and she cooked for him and other than some pain in his hand he was doing well.  However, today he had decreased LOC and was taken immediately to [place] and found to have a severe metabolic acidosis and NKHS (serum ketones negative).  He has a history of pseudomonas infection and cultures were attempted but difficulty with blood draw.  Insulin infusion started as well as norepinephrine 100% NRB (patient had previously requested no intubation or aggressive measures should he experience cardiac arrest) and volume resuscitation.  Patient transferred to [facility] ICU for further treatment.      Patient has old RBBB and was noted to have a few runs of Vtach.  He was in atrial fibrillation RVR at [facility].  He had demand ischemia noted on labs as well.  CXR clear but too unstable to transport for head CT.      Patient was unresponsive on arrival.  Thought to be in PEA when transferred from stretcher to bed but had a pulse and some agonal respirations.  Aggressive measures as mentioned above were continued with amp of bicarb ordered as well as cefepime for possible infectious sepsis.  Family arrived and discussed poor prognosis at bedside.  Confirmed DNR status.      Patient  at .   Pupils were fixed and dilated with no heart of breath sounds auscultated.    Patient transferred to [facility] per family wishes.      Critical care time at bedside 40 minutes.

## 2025-07-13 NOTE — NURSING
Cheri notified at this time. Spoke with Gena. Gena stated to prep the eyes for possible donation. Referral number is [id number].

## 2025-07-13 NOTE — DISCHARGE SUMMARY
[facility]  Hospital Medicine  Discharge Summary      Patient Name: Chandan Gutierrez  MRN: [id number]  KATHLEEN: [id number]  Patient Class: IP- Inpatient  Admission Date: 2025  Hospital Length of Stay: 0 days  Discharge Date and Time: 2025 9:46 PM  Attending Physician: Toan Keyes MD   Discharging Provider: Chayo Irene MD  Primary Care Provider: Bailee Durbin DO    Primary Care Team: Networked reference to record PCT     HPI:   No notes on file    * No surgery found *      Hospital Course:   See H&P note for critical care note.  Patient  at .  No charge for DC.  See critical care time     Goals of Care Treatment Preferences:  Code Status: DNR         Consults:     Assessment & Plan    There are no hospital problems to display for this patient.      Discharged Condition:     Disposition:     Follow Up:    Patient Instructions:   No discharge procedures on file.    Significant Diagnostic Studies: N/A    Pending Diagnostic Studies:       None           Medications:  None    Indwelling Lines/Drains at time of discharge:   Lines/Drains/Airways       None                             Time spent on the discharge of patient: less than 20 minutes         Chayo Irene MD  Department of Hospital Medicine  [facility]

## 2025-07-14 LAB
GLUCOSE SERPL-MCNC: >600 MG/DL (ref 70–105)
GLUCOSE SERPL-MCNC: >600 MG/DL (ref 70–105)
OHS QRS DURATION: 114 MS
OHS QRS DURATION: 132 MS
OHS QTC CALCULATION: 502 MS
OHS QTC CALCULATION: 554 MS

## 2025-07-15 LAB — UA COMPLETE W REFLEX CULTURE PNL UR: ABNORMAL
